# Patient Record
Sex: MALE | Race: WHITE
[De-identification: names, ages, dates, MRNs, and addresses within clinical notes are randomized per-mention and may not be internally consistent; named-entity substitution may affect disease eponyms.]

---

## 2017-05-20 ENCOUNTER — HOSPITAL ENCOUNTER (INPATIENT)
Dept: HOSPITAL 17 - PHED | Age: 67
LOS: 9 days | Discharge: HOME | DRG: 871 | End: 2017-05-29
Attending: INTERNAL MEDICINE | Admitting: INTERNAL MEDICINE
Payer: MEDICARE

## 2017-05-20 VITALS
DIASTOLIC BLOOD PRESSURE: 64 MMHG | OXYGEN SATURATION: 92 % | HEART RATE: 122 BPM | RESPIRATION RATE: 17 BRPM | TEMPERATURE: 103 F | SYSTOLIC BLOOD PRESSURE: 93 MMHG

## 2017-05-20 VITALS — WEIGHT: 315 LBS | BODY MASS INDEX: 41.75 KG/M2 | HEIGHT: 73 IN

## 2017-05-20 VITALS
HEART RATE: 98 BPM | RESPIRATION RATE: 20 BRPM | DIASTOLIC BLOOD PRESSURE: 52 MMHG | SYSTOLIC BLOOD PRESSURE: 126 MMHG | OXYGEN SATURATION: 96 %

## 2017-05-20 VITALS
SYSTOLIC BLOOD PRESSURE: 131 MMHG | RESPIRATION RATE: 20 BRPM | OXYGEN SATURATION: 97 % | TEMPERATURE: 100.4 F | HEART RATE: 93 BPM | DIASTOLIC BLOOD PRESSURE: 64 MMHG

## 2017-05-20 VITALS
HEART RATE: 110 BPM | RESPIRATION RATE: 18 BRPM | DIASTOLIC BLOOD PRESSURE: 57 MMHG | SYSTOLIC BLOOD PRESSURE: 109 MMHG | OXYGEN SATURATION: 93 %

## 2017-05-20 VITALS — OXYGEN SATURATION: 96 %

## 2017-05-20 VITALS
DIASTOLIC BLOOD PRESSURE: 84 MMHG | RESPIRATION RATE: 16 BRPM | TEMPERATURE: 98.7 F | SYSTOLIC BLOOD PRESSURE: 102 MMHG | OXYGEN SATURATION: 97 % | HEART RATE: 78 BPM

## 2017-05-20 VITALS — HEART RATE: 78 BPM

## 2017-05-20 VITALS — TEMPERATURE: 101.5 F

## 2017-05-20 DIAGNOSIS — E87.6: ICD-10-CM

## 2017-05-20 DIAGNOSIS — Z98.1: ICD-10-CM

## 2017-05-20 DIAGNOSIS — N17.9: ICD-10-CM

## 2017-05-20 DIAGNOSIS — B96.4: ICD-10-CM

## 2017-05-20 DIAGNOSIS — J45.909: ICD-10-CM

## 2017-05-20 DIAGNOSIS — E78.00: ICD-10-CM

## 2017-05-20 DIAGNOSIS — E66.9: ICD-10-CM

## 2017-05-20 DIAGNOSIS — D69.6: ICD-10-CM

## 2017-05-20 DIAGNOSIS — B37.0: ICD-10-CM

## 2017-05-20 DIAGNOSIS — N20.2: ICD-10-CM

## 2017-05-20 DIAGNOSIS — K59.2: ICD-10-CM

## 2017-05-20 DIAGNOSIS — Z87.442: ICD-10-CM

## 2017-05-20 DIAGNOSIS — G82.54: ICD-10-CM

## 2017-05-20 DIAGNOSIS — E87.70: ICD-10-CM

## 2017-05-20 DIAGNOSIS — J44.9: ICD-10-CM

## 2017-05-20 DIAGNOSIS — D64.9: ICD-10-CM

## 2017-05-20 DIAGNOSIS — E78.5: ICD-10-CM

## 2017-05-20 DIAGNOSIS — I10: ICD-10-CM

## 2017-05-20 DIAGNOSIS — A41.51: Primary | ICD-10-CM

## 2017-05-20 DIAGNOSIS — Z93.3: ICD-10-CM

## 2017-05-20 DIAGNOSIS — G47.33: ICD-10-CM

## 2017-05-20 DIAGNOSIS — K21.9: ICD-10-CM

## 2017-05-20 DIAGNOSIS — E11.65: ICD-10-CM

## 2017-05-20 DIAGNOSIS — E87.2: ICD-10-CM

## 2017-05-20 DIAGNOSIS — N31.9: ICD-10-CM

## 2017-05-20 DIAGNOSIS — R00.0: ICD-10-CM

## 2017-05-20 DIAGNOSIS — F32.9: ICD-10-CM

## 2017-05-20 DIAGNOSIS — N13.6: ICD-10-CM

## 2017-05-20 DIAGNOSIS — N39.0: ICD-10-CM

## 2017-05-20 DIAGNOSIS — Z86.14: ICD-10-CM

## 2017-05-20 DIAGNOSIS — R65.21: ICD-10-CM

## 2017-05-20 DIAGNOSIS — E86.0: ICD-10-CM

## 2017-05-20 DIAGNOSIS — K44.9: ICD-10-CM

## 2017-05-20 DIAGNOSIS — Z98.84: ICD-10-CM

## 2017-05-20 LAB
ALP SERPL-CCNC: 59 U/L (ref 45–117)
ALT SERPL-CCNC: 23 U/L (ref 12–78)
ANION GAP SERPL CALC-SCNC: 10 MEQ/L (ref 5–15)
APTT BLD: 33.6 SEC (ref 24.3–30.1)
AST SERPL-CCNC: 18 U/L (ref 15–37)
BACTERIA #/AREA URNS HPF: (no result) /HPF
BASOPHILS # BLD AUTO: 0.2 TH/MM3 (ref 0–0.2)
BASOPHILS NFR BLD: 1 % (ref 0–2)
BILIRUB SERPL-MCNC: 0.5 MG/DL (ref 0.2–1)
BUN SERPL-MCNC: 27 MG/DL (ref 7–18)
CHLORIDE SERPL-SCNC: 99 MEQ/L (ref 98–107)
CK SERPL-CCNC: 147 U/L (ref 39–308)
COLOR UR: (no result)
COMMENT (UR): (no result)
CULTURE IF INDICATED: (no result)
EOSINOPHIL # BLD: 0 TH/MM3 (ref 0–0.4)
EOSINOPHIL NFR BLD: 0.2 % (ref 0–4)
ERYTHROCYTE [DISTWIDTH] IN BLOOD BY AUTOMATED COUNT: 16.8 % (ref 11.6–17.2)
GFR SERPLBLD BASED ON 1.73 SQ M-ARVRAT: 43 ML/MIN (ref 89–?)
GLUCOSE UR STRIP-MCNC: (no result) MG/DL
HCO3 BLD-SCNC: 26.5 MEQ/L (ref 21–32)
HCT VFR BLD CALC: 33.2 % (ref 39–51)
HEMO FLAGS: (no result)
HGB UR QL STRIP: (no result)
INR PPP: 1.1 RATIO
KETONES UR STRIP-MCNC: (no result) MG/DL
LABORATORY COMMENT REPORT: (no result)
LACTIC ACID GHOST: (no result)
LEUKOCYTE ESTERASE UR QL STRIP: (no result) /HPF (ref 0–5)
LYMPHOCYTES # BLD AUTO: 0.5 TH/MM3 (ref 1–4.8)
LYMPHOCYTES NFR BLD AUTO: 2.7 % (ref 9–44)
MCH RBC QN AUTO: 27.5 PG (ref 27–34)
MCHC RBC AUTO-ENTMCNC: 33.8 % (ref 32–36)
MCV RBC AUTO: 81.2 FL (ref 80–100)
MONOCYTES NFR BLD: 5.8 % (ref 0–8)
NEUTROPHILS # BLD AUTO: 15.7 TH/MM3 (ref 1.8–7.7)
NEUTROPHILS NFR BLD AUTO: 90.3 % (ref 16–70)
NEUTS BAND # BLD MANUAL: 16 TH/MM3 (ref 1.8–7.7)
NEUTS BAND NFR BLD: 10 % (ref 0–6)
NEUTS SEG NFR BLD MANUAL: 82 % (ref 16–70)
NITRITE UR QL STRIP: (no result)
PLAT MORPH BLD: NORMAL
PLATELET # BLD: 250 TH/MM3 (ref 150–450)
PLATELET BLD QL SMEAR: NORMAL
POTASSIUM SERPL-SCNC: 3.5 MEQ/L (ref 3.5–5.1)
PROTHROMBIN TIME: 11.7 SEC (ref 9.8–11.6)
RBC # BLD AUTO: 4.09 MIL/MM3 (ref 4.5–5.9)
RBC #/AREA URNS HPF: (no result) /HPF (ref 0–3)
RBC MORPH BLD: NORMAL
SCAN/DIFF: (no result)
SODIUM SERPL-SCNC: 135 MEQ/L (ref 136–145)
SP GR UR STRIP: 1.02 (ref 1–1.03)
SQUAMOUS #/AREA URNS HPF: (no result) /HPF (ref 0–5)
WBC # BLD AUTO: 17.4 TH/MM3 (ref 4–11)
WBC DIFF SAMPLE: 100

## 2017-05-20 PROCEDURE — C1894 INTRO/SHEATH, NON-LASER: HCPCS

## 2017-05-20 PROCEDURE — 87077 CULTURE AEROBIC IDENTIFY: CPT

## 2017-05-20 PROCEDURE — 93005 ELECTROCARDIOGRAM TRACING: CPT

## 2017-05-20 PROCEDURE — 84100 ASSAY OF PHOSPHORUS: CPT

## 2017-05-20 PROCEDURE — 81001 URINALYSIS AUTO W/SCOPE: CPT

## 2017-05-20 PROCEDURE — 87070 CULTURE OTHR SPECIMN AEROBIC: CPT

## 2017-05-20 PROCEDURE — P9047 ALBUMIN (HUMAN), 25%, 50ML: HCPCS

## 2017-05-20 PROCEDURE — 51702 INSERT TEMP BLADDER CATH: CPT

## 2017-05-20 PROCEDURE — 80202 ASSAY OF VANCOMYCIN: CPT

## 2017-05-20 PROCEDURE — 87205 SMEAR GRAM STAIN: CPT

## 2017-05-20 PROCEDURE — 82948 REAGENT STRIP/BLOOD GLUCOSE: CPT

## 2017-05-20 PROCEDURE — 74176 CT ABD & PELVIS W/O CONTRAST: CPT

## 2017-05-20 PROCEDURE — 94668 MNPJ CHEST WALL SBSQ: CPT

## 2017-05-20 PROCEDURE — 96367 TX/PROPH/DG ADDL SEQ IV INF: CPT

## 2017-05-20 PROCEDURE — 94640 AIRWAY INHALATION TREATMENT: CPT

## 2017-05-20 PROCEDURE — 85025 COMPLETE CBC W/AUTO DIFF WBC: CPT

## 2017-05-20 PROCEDURE — 83735 ASSAY OF MAGNESIUM: CPT

## 2017-05-20 PROCEDURE — 87186 SC STD MICRODIL/AGAR DIL: CPT

## 2017-05-20 PROCEDURE — 36556 INSERT NON-TUNNEL CV CATH: CPT

## 2017-05-20 PROCEDURE — C1729 CATH, DRAINAGE: HCPCS

## 2017-05-20 PROCEDURE — 84484 ASSAY OF TROPONIN QUANT: CPT

## 2017-05-20 PROCEDURE — 87040 BLOOD CULTURE FOR BACTERIA: CPT

## 2017-05-20 PROCEDURE — 85027 COMPLETE CBC AUTOMATED: CPT

## 2017-05-20 PROCEDURE — 96365 THER/PROPH/DIAG IV INF INIT: CPT

## 2017-05-20 PROCEDURE — 85730 THROMBOPLASTIN TIME PARTIAL: CPT

## 2017-05-20 PROCEDURE — 83880 ASSAY OF NATRIURETIC PEPTIDE: CPT

## 2017-05-20 PROCEDURE — 76775 US EXAM ABDO BACK WALL LIM: CPT

## 2017-05-20 PROCEDURE — 71010: CPT

## 2017-05-20 PROCEDURE — 84132 ASSAY OF SERUM POTASSIUM: CPT

## 2017-05-20 PROCEDURE — 87086 URINE CULTURE/COLONY COUNT: CPT

## 2017-05-20 PROCEDURE — 50432 PLMT NEPHROSTOMY CATHETER: CPT

## 2017-05-20 PROCEDURE — 83605 ASSAY OF LACTIC ACID: CPT

## 2017-05-20 PROCEDURE — 0T9B70Z DRAINAGE OF BLADDER WITH DRAINAGE DEVICE, VIA NATURAL OR ARTIFICIAL OPENING: ICD-10-PCS

## 2017-05-20 PROCEDURE — 85610 PROTHROMBIN TIME: CPT

## 2017-05-20 PROCEDURE — C1769 GUIDE WIRE: HCPCS

## 2017-05-20 PROCEDURE — 87641 MR-STAPH DNA AMP PROBE: CPT

## 2017-05-20 PROCEDURE — 80048 BASIC METABOLIC PNL TOTAL CA: CPT

## 2017-05-20 PROCEDURE — 82550 ASSAY OF CK (CPK): CPT

## 2017-05-20 PROCEDURE — 94667 MNPJ CHEST WALL 1ST: CPT

## 2017-05-20 PROCEDURE — 80053 COMPREHEN METABOLIC PANEL: CPT

## 2017-05-20 PROCEDURE — 94664 DEMO&/EVAL PT USE INHALER: CPT

## 2017-05-20 PROCEDURE — 85007 BL SMEAR W/DIFF WBC COUNT: CPT

## 2017-05-20 RX ADMIN — BACLOFEN SCH MG: 10 TABLET ORAL at 23:34

## 2017-05-20 RX ADMIN — GABAPENTIN SCH MG: 300 CAPSULE ORAL at 23:34

## 2017-05-20 RX ADMIN — PHENYTOIN SODIUM SCH MLS/HR: 50 INJECTION INTRAMUSCULAR; INTRAVENOUS at 23:40

## 2017-05-20 NOTE — PD
Physical Exam


Date Seen by Provider:  May 20, 2017


Time Seen by Provider:  19:14


Narrative


accepted in transfer of care from Dr Rodrigues


GENERAL: Well-developed well-nourished male in no acute respiratory distress


SKIN: Warm and dry.


HEAD: Normocephalic.


EYES: No scleral icterus. No injection or drainage. 


NECK: Supple, trachea midline. No JVD or lymphadenopathy.


CARDIOVASCULAR: Regular rate and rhythm without murmurs, gallops, or rubs. 


RESPIRATORY: Breath sounds equal bilaterally. No accessory muscle use.


GASTROINTESTINAL: Abdomen soft, non-tender, nondistended. 











Data


Data


Last Documented VS





Vital Signs








  Date Time  Temp Pulse Resp B/P Pulse Ox O2 Delivery O2 Flow Rate FiO2


 


5/20/17 20:19 100.4 93 20 131/64 97 Room Air  








Orders





 Sodium Chlor 0.9% 1000 Ml Inj (Ns 1000 M (5/20/17 18:45)


Sodium Chlor 0.9% 1000 Ml Inj (Ns 1000 M (5/20/17 18:45)


Acetaminophen (Tylenol) (5/20/17 18:45)


Electrocardiogram (5/20/17 18:32)


Complete Blood Count With Diff (5/20/17 18:32)


Comprehensive Metabolic Panel (5/20/17 18:32)


Creatine Kinase (Cpk) (5/20/17 18:32)


Troponin I (5/20/17 18:32)


B-Type Natriuretic Peptide (5/20/17 18:32)


Prothrombin Time / Inr (Pt) (5/20/17 18:32)


Act Partial Throm Time (Ptt) (5/20/17 18:32)


Blood Culture (5/20/17 18:32)


Urinalysis - C+S If Indicated (5/20/17 18:32)


Chest, Single Ap (5/20/17 18:32)


Iv Access Insert/Monitor (5/20/17 18:32)


Ecg Monitoring (5/20/17 18:32)


Oximetry (5/20/17 18:32)


Urinary Catheter Insert/Apply (5/20/17 18:32)


Lactic Acid Sepsis Protocol (5/20/17 18:32)


Piperacil-Tazo 3.375 Gm Premix (Zosyn 3. (5/20/17 18:45)


Vancomycin Inj (Vancomycin Inj) (5/20/17 18:45)


Urine Culture (5/20/17 19:20)


Admit Order (Ed Use Only) (5/20/17 )


^ Saline Lock (5/20/17 20:29)


Resp Oxygen Ever C Titrat 1-4 L (5/20/17 )


Notify Dr: Other (5/20/17 20:29)


Sodium Chloride 0.9% Flush (Ns Flush) (5/20/17 21:00)


Sodium Chloride 0.9% Flush (Ns Flush) (5/20/17 20:30)





Labs





 Laboratory Tests








Test 5/20/17 5/20/17





 18:50 19:20


 


White Blood Count 17.4 TH/MM3 


 


Red Blood Count 4.09 MIL/MM3 


 


Hemoglobin 11.2 GM/DL 


 


Hematocrit 33.2 % 


 


Mean Corpuscular Volume 81.2 FL 


 


Mean Corpuscular Hemoglobin 27.5 PG 


 


Mean Corpuscular Hemoglobin 33.8 % 





Concent  


 


Red Cell Distribution Width 16.8 % 


 


Platelet Count 250 TH/MM3 


 


Mean Platelet Volume 8.1 FL 


 


Neutrophils (%) (Auto) 90.3 % 


 


Lymphocytes (%) (Auto) 2.7 % 


 


Monocytes (%) (Auto) 5.8 % 


 


Eosinophils (%) (Auto) 0.2 % 


 


Basophils (%) (Auto) 1.0 % 


 


Neutrophils # (Auto) 15.7 TH/MM3 


 


Lymphocytes # (Auto) 0.5 TH/MM3 


 


Monocytes # (Auto) 1.0 TH/MM3 


 


Eosinophils # (Auto) 0.0 TH/MM3 


 


Basophils # (Auto) 0.2 TH/MM3 


 


CBC Comment AUTO DIFF  


 


Differential Total Cells 100  





Counted  


 


Neutrophils % (Manual) 82 % 


 


Band Neutrophils % 10 % 


 


Lymphocytes % 2 % 


 


Monocytes % 6 % 


 


Neutrophils # (Manual) 16.0 TH/MM3 


 


Differential Comment FINAL DIFF 





 MANUAL 


 


Platelet Estimate NORMAL  


 


Platelet Morphology Comment NORMAL  


 


Red Cell Morphology Comment NORMAL  


 


Prothrombin Time 11.7 SEC 


 


Prothromb Time International 1.1 RATIO 





Ratio  


 


Activated Partial 33.6 SEC 





Thromboplast Time  


 


Sodium Level 135 MEQ/L 


 


Potassium Level 3.5 MEQ/L 


 


Chloride Level 99 MEQ/L 


 


Carbon Dioxide Level 26.5 MEQ/L 


 


Anion Gap 10 MEQ/L 


 


Blood Urea Nitrogen 27 MG/DL 


 


Creatinine 1.60 MG/DL 


 


Estimat Glomerular Filtration 43 ML/MIN 





Rate  


 


Random Glucose 257 MG/DL 


 


Lactic Acid Level 2.4 mmol/L 


 


Calcium Level 8.8 MG/DL 


 


Total Bilirubin 0.5 MG/DL 


 


Aspartate Amino Transf 18 U/L 





(AST/SGOT)  


 


Alanine Aminotransferase 23 U/L 





(ALT/SGPT)  


 


Alkaline Phosphatase 59 U/L 


 


Total Creatine Kinase 147 U/L 


 


Troponin I LESS THAN 0.02 





 NG/ML 


 


B-Type Natriuretic Peptide 26 PG/ML 


 


Total Protein 7.3 GM/DL 


 


Albumin 2.8 GM/DL 


 


Urine Color  PINK 


 


Urine Turbidity  CLOUDY 


 


Urine pH  7.0 


 


Urine Specific Gravity  1.019 


 


Urine Protein  100 mg/dL


 


Urine Glucose (UA)  NEG mg/dL


 


Urine Ketones  NEG mg/dL


 


Urine Occult Blood  LARGE 


 


Urine Nitrite  POS 


 


Urine Bilirubin  NEG 


 


Urine Leukocyte Esterase  LARGE 


 


Urine RBC  INNUM /hpf


 


Urine WBC  INNUM /hpf


 


Urine Squamous Epithelial  0-5 /hpf





Cells  


 


Urine Triple Phosphate  FEW /hpf





Crystals  


 


Urine Bacteria  MANY /hpf


 


Microscopic Urinalysis Comment  CATH-CULTURE





  IND











MDM


Medical Record Reviewed:  Yes


Supervised Visit with ANNIE:  No


Interpretation(s)


CBC & BMP Diagram


5/20/17 18:50











 Vital Signs








  Date Time  Temp Pulse Resp B/P Pulse Ox O2 Delivery O2 Flow Rate FiO2


 


5/20/17 19:39  96    Room Air  


 


5/20/17 19:38  98 20 126/52 96 Room Air  


 


5/20/17 19:05 101.5       


 


5/20/17 18:55  110 18 109/57 93   


 


5/20/17 18:09 103.0 122 17 93/64 92   





cath UA: Positive nitrites positive leukocyte Estrace positive blood positive 

innumerable red blood cells innumerable white blood cells many bacteria; 

culture indicated


Differential Diagnosis


accepted in transfer of care from Dr Rodrigues; please refer to his dictation


Narrative Course


accepted in transfer of care from Dr Rodrigues; follow up of pending labs and 

admission for sepsis; has been given 2 L NS IVF and zosyn and vancomycin IVPB 

along with acetaminophen


Patient with gcs 15; informed of imaging results for possible scarring versus 

atelectasis versus right base consolidation patient reports that he has had 

this finding for some time.  States that her shortness of breath this time.  

States that his chronic congestion has not changed and no discoloration of 

phlegm production.  Catheterized specimen just collected for urinalysis.  

Patient is aware of plan for admission for ongoing IV antibiotics for diagnosis 

of sepsis with urine as suspected source.


Urinalysis positive for many bacteria nitrites leukocyte Estrace innumerable wbc

's/rbc's; cx indicated


call placed to intensivist; patient and spouse aware of need for admission


At 8:30 patient is showing improvement of vital signs normotensive heart rate 

has decreased and patient is slowly defervesced seeing after acetaminophen 

administration


Critical Care Narrative


Aggregate critical care time was 35 minutes. Time to perform other separately 

billable procedures was not included in the critical care time. My time did not 

include minutes spent treating any other patients simultaneously or on 

activities that did not directly contribute to the patient's treatment.  





The services I provided to this patient were to treat and/or prevent clinically 

significant deterioration that could result in: Septic shock, arrhythmia, death





I provided critical care services requiring my management, as noted below:


Chart data review, documentation time, medication orders and management, vital 

sign assessments/reviewing monitor data, ordering and reviewing lab tests, 

ordering and interpreting/reviewing x-rays and diagnostic studies, care of the 

patient and discussion of the patient with the admitting physicians.


Sepsis Criteria


SIRS Criteria (2 or more):  Temp > 100.9 or < 96.8, Heart rate over 90, WBC > 

84063, < 4000 or > 10% bands


Sepsis Criteria (SIRS+source):  Infect source susp/known (urine)


Severe Sepsis (+one):  Lactate >2, Acute Oliguria/Renal Failure





Physician Communication


Physician Communication


call placed to intensivist 1951; discussed with Dr Moctezuma 20:32





Diagnosis





 Primary Impression:  


 Sepsis


 Qualified Code:  A41.9 - Sepsis, due to unspecified organism


 Additional Impressions:  


 UTI (urinary tract infection)


 Qualified Code:  N39.0 - Urinary tract infection with hematuria, site 

unspecified


 Neurogenic bladder


 Quadriplegia, C5-C7 incomplete


 Renal insufficiency


 History of COPD





Admitting Information


Admitting Physician Requests:  Admit








Cleo Lopes MD May 20, 2017 19:23

## 2017-05-20 NOTE — RADHPO
EXAM DATE/TIME:  05/20/2017 18:42 

 

[facility] COMPARISON:     

No previous studies available for comparison.

 

                     

INDICATIONS :     

Fever.

                     

 

MEDICAL HISTORY :            

Paralysis   

 

SURGICAL HISTORY :     

Colostomy.   Anterior cervical fusion

 

ENCOUNTER:     

Initial                                        

 

ACUITY:     

1 day      

 

PAIN SCORE:     

2/10

 

LOCATION:     

Bilateral chest 

 

FINDINGS:     

The heart size is normal.  There is some mild increased density at the medial right base.  Left lung 
appears grossly clear.  A significant effusion is not seen.  There is hardware identified in the cerv
ical spine.  There is a levo curvature of the thoracolumbar region. 

 

CONCLUSION:     

Mild increased density in the medial right base representing some degree of mild atelectasis or conso
lidation.

 

 

 Mike Chilel MD on May 20, 2017 at 19:19                

Board Certified Radiologist.

 This report was verified electronically.

## 2017-05-20 NOTE — PD
HPI


Chief Complaint:  Cold / Flu Symptoms


Time Seen by Provider:  18:26


Travel History


International Travel<30 days:  No


Contact w/Intl Traveler<30days:  No


Traveled to known affect area:  No





History of Present Illness


HPI


67 years old male complains of coughing congestion, fever chills or generalized 

malaise and weakness.  Patient states the symptoms started yesterday.  Patient 

states that the cough is intermittent and dry cough.  Patient denies any chest 

pain or shortness of breath.  Patient states that he has persistent mild aching 

headache since yesterday.  Patient denies any visual change.  Patient denies 

any neck pain.  Patient states that he has abdominal cramping since yesterday 

also.  Patient states that he has nausea but no vomiting or diarrhea.  Patient 

states that he has poor appetite for the past 2 days.  Patient has history of 

quadriplegic C5 C7 incomplete, neurogenic bladder, neurogenic bowel, colostomy, 

hypertension, diabetes, COPD.  Patient self catheter himself at home.  Patient 

states that the urine has been bloody recently from history of kidney stone.





PFSH


Past Medical History


Asthma:  Yes


Blood Disorders:  No


Anxiety:  No


Depression:  Yes


Cancer:  No


Cardiovascular Problems:  No


High Cholesterol:  Yes


Diabetes:  Yes (TYPE 2)


Diminished Hearing:  No


Endocrine:  Yes


Gastrointestinal Disorders:  Yes (COLOSTOMY)


Genitourinary:  Yes (SELF CATHETERIZATION)


Hiatal Hernia:  Yes


Hypertension:  Yes


Immune Disorder:  No


Musculoskeletal:  Yes (QUADRAPLEGIC SINCE 2007, ARTHRITIES, NECK)


Psychiatric:  Yes (DEPRESSION HX)


Reproductive:  No


Respiratory:  Yes (COPD,ASTHMA SLEEP APNEA, USES CPAP)


Sleep Apnea:  Yes


Thyroid Disease:  No


Pregnant?:  Not Pregnant





Past Surgical History


Abdominal Surgery:  Yes (COLOSTOMY)


AICD:  No


Body Medical Devices:  CERVICAL SPINE FUSION 2007


Joint Replacement:  No


Neurologic Surgery:  Yes (CERVICAL FUSION 6-7)


Pacemaker:  No





Social History


Alcohol Use:  No


Tobacco Use:  No


Substance Use:  No





Allergies-Medications


(Allergen,Severity, Reaction):  


Coded Allergies:  


     Morphine (Verified  Allergy, Severe, 5/20/17)


 HALLUCINATIONS


     Sulfa (Verified  Allergy, Severe, 5/20/17)


 SKIN RASH


     *MDRO Multi-Drug Resistant Organism (Verified  Allergy, Unknown, 2/27/17)


 MRSA 2008


 Enterococcus faecium VRE 2007


 Carbapenem-resistant Acinetobacter baumannii 2007


Reported Meds & Prescriptions





Reported Meds & Active Scripts


Active


Reported


Proventil Hfa 6.7 GM Inh (Albuterol Sulfate) 90 Mcg/Act Aer 1 Puff INH Q6H PRN


Aspirin 81 Mg Tabdr 81 Mg PO DAILY


Baclofen 10 Mg Tab 10 Mg PO BID


Symbicort Inh (Budesonide/Formoterol Fumarate) 160-4.5 Mcg/Act Aero 2 Puff INH 

BID


Desipramine (Desipramine HCl) 25 Mg Tab 25 Mg PO HS


Diphenhydramine (Diphenhydramine HCl) 25 Mg Tab 25 Mg PO HS


Gabapentin 300 Mg Cap 300 Mg PO BID


Hydrochlorothiazide 25 Mg Tab 25 Mg PO DAILY


Losartan (Losartan Potassium) 50 Mg Tab 100 Mg PO DAILY


Omeprazole 20 Mg Tab 20 Mg PO DAILY


Oxybutynin ER 24 HR (Oxybutynin Chloride) 5 Mg Tab 5 Mg PO BID


Phenergan (Promethazine HCl) 25 Mg Tab 25 Mg PO Q4HR PRN


Simvastatin 20 Mg Tab 20 Mg PO HS








Review of Systems


General / Constitutional:  Positive: Fever, Chills


Eyes:  No: Visual changes


HENT:  No: Headaches


Cardiovascular:  No: Chest Pain or Discomfort


Respiratory:  Positive: Cough,  No: Shortness of Breath


Gastrointestinal:  No: Abdominal Pain


Genitourinary:  No: Dysuria


Musculoskeletal:  No: Pain


Skin:  No Rash


Neurologic:  No: Weakness


Psychiatric:  No: Depression


Endocrine:  No: Polydipsia


Hematologic/Lymphatic:  No: Easy Bruising





Physical Exam


Narrative


GENERAL: Well-nourished, well-developed patient.


SKIN: Focused skin assessment warm/dry.


HEAD: Normocephalic.


EYES: No scleral icterus. No injection or drainage. 


NECK: Supple, trachea midline. No JVD or lymphadenopathy.


CARDIOVASCULAR: Regular rate and rhythm without murmurs, gallops, or rubs. 


RESPIRATORY: Breath sounds equal bilaterally. No accessory muscle use.


GASTROINTESTINAL: Abdomen soft, non-tender, nondistended.  Colostomy in place.


MUSCULOSKELETAL: No cyanosis, or edema. 


BACK: Nontender without obvious deformity. No CVA tenderness. 


Neurologic exam: Patient is lethargic however answer questions appropriately.  

Patient is quadriplegic C5 C7 incomplete.





Data


Data


Last Documented VS





Vital Signs








  Date Time  Temp Pulse Resp B/P Pulse Ox O2 Delivery O2 Flow Rate FiO2


 


5/20/17 18:09 103.0 122 17 93/64 92   








Orders





 Sodium Chlor 0.9% 1000 Ml Inj (Ns 1000 M (5/20/17 18:45)


Sodium Chlor 0.9% 1000 Ml Inj (Ns 1000 M (5/20/17 18:45)


Acetaminophen (Tylenol) (5/20/17 18:45)


Electrocardiogram (5/20/17 18:32)


Complete Blood Count With Diff (5/20/17 18:32)


Comprehensive Metabolic Panel (5/20/17 18:32)


Creatine Kinase (Cpk) (5/20/17 18:32)


Troponin I (5/20/17 18:32)


B-Type Natriuretic Peptide (5/20/17 18:32)


Prothrombin Time / Inr (Pt) (5/20/17 18:32)


Act Partial Throm Time (Ptt) (5/20/17 18:32)


Blood Culture (5/20/17 18:32)


Urinalysis - C+S If Indicated (5/20/17 18:32)


Chest, Single Ap (5/20/17 18:32)


Iv Access Insert/Monitor (5/20/17 18:32)


Ecg Monitoring (5/20/17 18:32)


Oximetry (5/20/17 18:32)


Urinary Catheter Insert/Apply (5/20/17 18:32)


Lactic Acid Sepsis Protocol (5/20/17 18:32)


Piperacil-Tazo 3.375 Gm Premix (Zosyn 3. (5/20/17 18:45)


Vancomycin Inj (Vancomycin Inj) (5/20/17 18:45)








MDM


Medical Decision Making


Medical Screen Exam Complete:  Yes


Emergency Medical Condition:  Yes


Medical Record Reviewed:  Yes


Differential Diagnosis


Differential diagnosis including sepsis, pneumonia, UTI, electrolyte imbalance, 

dehydration


Narrative Course


67 years old male with fever chills.  Patient is tachycardic.  Patient met 

criteria for sepsis.  Normal saline solution 2 L IV bolus.  Vancomycin 1 g IV.  

Zosyn 3.375 g IV given.








Mitch Rodrigues MD May 20, 2017 18:44

## 2017-05-21 VITALS — HEART RATE: 84 BPM

## 2017-05-21 VITALS
SYSTOLIC BLOOD PRESSURE: 112 MMHG | HEART RATE: 115 BPM | TEMPERATURE: 102 F | DIASTOLIC BLOOD PRESSURE: 40 MMHG | OXYGEN SATURATION: 99 % | RESPIRATION RATE: 21 BRPM

## 2017-05-21 VITALS
OXYGEN SATURATION: 98 % | DIASTOLIC BLOOD PRESSURE: 61 MMHG | HEART RATE: 78 BPM | RESPIRATION RATE: 17 BRPM | SYSTOLIC BLOOD PRESSURE: 107 MMHG

## 2017-05-21 VITALS
RESPIRATION RATE: 22 BRPM | SYSTOLIC BLOOD PRESSURE: 148 MMHG | DIASTOLIC BLOOD PRESSURE: 64 MMHG | OXYGEN SATURATION: 96 % | HEART RATE: 72 BPM

## 2017-05-21 VITALS
HEART RATE: 96 BPM | DIASTOLIC BLOOD PRESSURE: 65 MMHG | RESPIRATION RATE: 23 BRPM | OXYGEN SATURATION: 99 % | SYSTOLIC BLOOD PRESSURE: 95 MMHG | TEMPERATURE: 100.9 F

## 2017-05-21 VITALS — OXYGEN SATURATION: 96 %

## 2017-05-21 VITALS — HEART RATE: 80 BPM

## 2017-05-21 VITALS — HEART RATE: 112 BPM

## 2017-05-21 VITALS
SYSTOLIC BLOOD PRESSURE: 100 MMHG | TEMPERATURE: 100 F | HEART RATE: 80 BPM | DIASTOLIC BLOOD PRESSURE: 51 MMHG | OXYGEN SATURATION: 99 %

## 2017-05-21 VITALS
HEART RATE: 82 BPM | RESPIRATION RATE: 23 BRPM | OXYGEN SATURATION: 98 % | DIASTOLIC BLOOD PRESSURE: 67 MMHG | SYSTOLIC BLOOD PRESSURE: 104 MMHG

## 2017-05-21 VITALS
SYSTOLIC BLOOD PRESSURE: 135 MMHG | HEART RATE: 84 BPM | RESPIRATION RATE: 15 BRPM | OXYGEN SATURATION: 98 % | TEMPERATURE: 98.5 F | DIASTOLIC BLOOD PRESSURE: 60 MMHG

## 2017-05-21 VITALS — OXYGEN SATURATION: 98 %

## 2017-05-21 VITALS
HEART RATE: 133 BPM | DIASTOLIC BLOOD PRESSURE: 65 MMHG | OXYGEN SATURATION: 93 % | SYSTOLIC BLOOD PRESSURE: 101 MMHG | RESPIRATION RATE: 24 BRPM | TEMPERATURE: 103 F

## 2017-05-21 VITALS — DIASTOLIC BLOOD PRESSURE: 42 MMHG | SYSTOLIC BLOOD PRESSURE: 65 MMHG | HEART RATE: 96 BPM | OXYGEN SATURATION: 96 %

## 2017-05-21 VITALS — HEART RATE: 91 BPM

## 2017-05-21 VITALS
SYSTOLIC BLOOD PRESSURE: 80 MMHG | TEMPERATURE: 105 F | RESPIRATION RATE: 21 BRPM | DIASTOLIC BLOOD PRESSURE: 56 MMHG | OXYGEN SATURATION: 99 % | HEART RATE: 116 BPM

## 2017-05-21 VITALS
HEART RATE: 101 BPM | SYSTOLIC BLOOD PRESSURE: 114 MMHG | DIASTOLIC BLOOD PRESSURE: 54 MMHG | OXYGEN SATURATION: 94 % | RESPIRATION RATE: 23 BRPM | TEMPERATURE: 101 F

## 2017-05-21 VITALS
DIASTOLIC BLOOD PRESSURE: 67 MMHG | SYSTOLIC BLOOD PRESSURE: 102 MMHG | HEART RATE: 90 BPM | RESPIRATION RATE: 27 BRPM | OXYGEN SATURATION: 96 %

## 2017-05-21 VITALS — OXYGEN SATURATION: 95 %

## 2017-05-21 VITALS — HEART RATE: 89 BPM

## 2017-05-21 LAB
ALP SERPL-CCNC: 83 U/L (ref 45–117)
ALT SERPL-CCNC: 22 U/L (ref 12–78)
ANION GAP SERPL CALC-SCNC: 10 MEQ/L (ref 5–15)
AST SERPL-CCNC: 21 U/L (ref 15–37)
BASOPHILS # BLD AUTO: 0 TH/MM3 (ref 0–0.2)
BASOPHILS NFR BLD: 0.1 % (ref 0–2)
BILIRUB SERPL-MCNC: 0.5 MG/DL (ref 0.2–1)
BUN SERPL-MCNC: 26 MG/DL (ref 7–18)
CHLORIDE SERPL-SCNC: 102 MEQ/L (ref 98–107)
EOSINOPHIL # BLD: 0 TH/MM3 (ref 0–0.4)
EOSINOPHIL NFR BLD: 0 % (ref 0–4)
ERYTHROCYTE [DISTWIDTH] IN BLOOD BY AUTOMATED COUNT: 16.8 % (ref 11.6–17.2)
GFR SERPLBLD BASED ON 1.73 SQ M-ARVRAT: 41 ML/MIN (ref 89–?)
HCO3 BLD-SCNC: 25.7 MEQ/L (ref 21–32)
HCT VFR BLD CALC: 32.1 % (ref 39–51)
HEMO FLAGS: (no result)
LYMPHOCYTES # BLD AUTO: 0.3 TH/MM3 (ref 1–4.8)
LYMPHOCYTES NFR BLD AUTO: 2.1 % (ref 9–44)
MCH RBC QN AUTO: 27 PG (ref 27–34)
MCHC RBC AUTO-ENTMCNC: 33.2 % (ref 32–36)
MCV RBC AUTO: 81.2 FL (ref 80–100)
MONOCYTES NFR BLD: 0.8 % (ref 0–8)
NEUTROPHILS # BLD AUTO: 13 TH/MM3 (ref 1.8–7.7)
NEUTROPHILS NFR BLD AUTO: 97 % (ref 16–70)
NEUTS BAND # BLD MANUAL: 13.1 TH/MM3 (ref 1.8–7.7)
NEUTS BAND NFR BLD: 32 % (ref 0–6)
NEUTS SEG NFR BLD MANUAL: 66 % (ref 16–70)
PLAT MORPH BLD: NORMAL
PLATELET # BLD: 196 TH/MM3 (ref 150–450)
PLATELET BLD QL SMEAR: NORMAL
POTASSIUM SERPL-SCNC: 2.9 MEQ/L (ref 3.5–5.1)
RBC # BLD AUTO: 3.95 MIL/MM3 (ref 4.5–5.9)
RBC MORPH BLD: NORMAL
SCAN/DIFF: (no result)
SODIUM SERPL-SCNC: 138 MEQ/L (ref 136–145)
WBC # BLD AUTO: 13.4 TH/MM3 (ref 4–11)
WBC DIFF SAMPLE: 100

## 2017-05-21 PROCEDURE — 05HN33Z INSERTION OF INFUSION DEVICE INTO LEFT INTERNAL JUGULAR VEIN, PERCUTANEOUS APPROACH: ICD-10-PCS | Performed by: INTERNAL MEDICINE

## 2017-05-21 RX ADMIN — IPRATROPIUM BROMIDE AND ALBUTEROL SULFATE SCH AMPULE: .5; 3 SOLUTION RESPIRATORY (INHALATION) at 07:31

## 2017-05-21 RX ADMIN — POTASSIUM CHLORIDE SCH MLS/HR: 200 INJECTION, SOLUTION INTRAVENOUS at 19:49

## 2017-05-21 RX ADMIN — SODIUM CHLORIDE SCH MLS/HR: 900 INJECTION INTRAVENOUS at 19:13

## 2017-05-21 RX ADMIN — BUDESONIDE AND FORMOTEROL FUMARATE DIHYDRATE SCH PUFF: 160; 4.5 AEROSOL RESPIRATORY (INHALATION) at 09:52

## 2017-05-21 RX ADMIN — PHENYTOIN SODIUM SCH MLS/HR: 50 INJECTION INTRAMUSCULAR; INTRAVENOUS at 22:06

## 2017-05-21 RX ADMIN — PHENYTOIN SODIUM SCH MLS/HR: 50 INJECTION INTRAMUSCULAR; INTRAVENOUS at 09:51

## 2017-05-21 RX ADMIN — INSULIN ASPART SCH: 100 INJECTION, SOLUTION INTRAVENOUS; SUBCUTANEOUS at 09:00

## 2017-05-21 RX ADMIN — TOLTERODINE TARTRATE SCH MG: 4 CAPSULE, EXTENDED RELEASE ORAL at 09:57

## 2017-05-21 RX ADMIN — INSULIN ASPART SCH: 100 INJECTION, SOLUTION INTRAVENOUS; SUBCUTANEOUS at 17:00

## 2017-05-21 RX ADMIN — IPRATROPIUM BROMIDE AND ALBUTEROL SULFATE SCH AMPULE: .5; 3 SOLUTION RESPIRATORY (INHALATION) at 13:21

## 2017-05-21 RX ADMIN — TAZOBACTAM SODIUM AND PIPERACILLIN SODIUM SCH MLS/HR: 500; 4 INJECTION, SOLUTION INTRAVENOUS at 14:51

## 2017-05-21 RX ADMIN — TAZOBACTAM SODIUM AND PIPERACILLIN SODIUM SCH MLS/HR: 500; 4 INJECTION, SOLUTION INTRAVENOUS at 19:49

## 2017-05-21 RX ADMIN — PROMETHAZINE HYDROCHLORIDE PRN MG: 25 TABLET ORAL at 10:37

## 2017-05-21 RX ADMIN — PANTOPRAZOLE SODIUM SCH MG: 20 TABLET, DELAYED RELEASE ORAL at 09:52

## 2017-05-21 RX ADMIN — PHENYTOIN SODIUM SCH MLS/HR: 50 INJECTION INTRAMUSCULAR; INTRAVENOUS at 14:52

## 2017-05-21 RX ADMIN — IPRATROPIUM BROMIDE AND ALBUTEROL SULFATE SCH AMPULE: .5; 3 SOLUTION RESPIRATORY (INHALATION) at 19:33

## 2017-05-21 RX ADMIN — ASPIRIN SCH MG: 81 TABLET ORAL at 09:52

## 2017-05-21 RX ADMIN — POTASSIUM CHLORIDE SCH MLS/HR: 200 INJECTION, SOLUTION INTRAVENOUS at 19:14

## 2017-05-21 RX ADMIN — Medication SCH ML: at 20:03

## 2017-05-21 RX ADMIN — Medication SCH ML: at 10:08

## 2017-05-21 RX ADMIN — GABAPENTIN SCH MG: 300 CAPSULE ORAL at 09:52

## 2017-05-21 RX ADMIN — INSULIN ASPART SCH: 100 INJECTION, SOLUTION INTRAVENOUS; SUBCUTANEOUS at 20:02

## 2017-05-21 RX ADMIN — BACLOFEN SCH MG: 10 TABLET ORAL at 09:52

## 2017-05-21 RX ADMIN — TAZOBACTAM SODIUM AND PIPERACILLIN SODIUM SCH MLS/HR: 500; 4 INJECTION, SOLUTION INTRAVENOUS at 02:41

## 2017-05-21 RX ADMIN — OXYCODONE HYDROCHLORIDE AND ACETAMINOPHEN SCH MG: 500 TABLET ORAL at 20:04

## 2017-05-21 RX ADMIN — ENOXAPARIN SODIUM SCH MG: 40 INJECTION SUBCUTANEOUS at 09:53

## 2017-05-21 RX ADMIN — TAZOBACTAM SODIUM AND PIPERACILLIN SODIUM SCH MLS/HR: 500; 4 INJECTION, SOLUTION INTRAVENOUS at 09:56

## 2017-05-21 RX ADMIN — BUDESONIDE AND FORMOTEROL FUMARATE DIHYDRATE SCH PUFF: 160; 4.5 AEROSOL RESPIRATORY (INHALATION) at 20:03

## 2017-05-21 RX ADMIN — OXYCODONE HYDROCHLORIDE AND ACETAMINOPHEN SCH MG: 500 TABLET ORAL at 09:52

## 2017-05-21 RX ADMIN — CHLORHEXIDINE GLUCONATE SCH PACK: 500 CLOTH TOPICAL at 03:55

## 2017-05-21 RX ADMIN — INSULIN ASPART SCH: 100 INJECTION, SOLUTION INTRAVENOUS; SUBCUTANEOUS at 13:00

## 2017-05-21 RX ADMIN — PRAVASTATIN SODIUM SCH MG: 40 TABLET ORAL at 20:04

## 2017-05-21 RX ADMIN — SODIUM CHLORIDE SCH MLS/HR: 900 INJECTION INTRAVENOUS at 16:00

## 2017-05-21 NOTE — RADHPO
EXAM DATE/TIME:  05/21/2017 05:12 

 

[facility] COMPARISON:     

CHEST SINGLE AP, May 20, 2017, 18:42.

 

                     

INDICATIONS :     

Cough. 

                     

 

MEDICAL HISTORY :            

Paralysis   

 

SURGICAL HISTORY :        

Colostomy. Anterior cervical fusion

 

ENCOUNTER:     

Subsequent                                        

 

ACUITY:     

2 days      

 

PAIN SCORE:     

0/10

 

LOCATION:     

Bilateral chest 

 

FINDINGS:     

A single view of the chest demonstrates minimal basilar atelectasis. No significant effusion. No pneu
mothorax. Previous fusion lower cervical spine. Tortuous aorta.

 

CONCLUSION:     

1. Minimal basilar atelectasis.

 

 

 

 Gustavo Brink MD on May 21, 2017 at 5:42           

Board Certified Radiologist.

 This report was verified electronically.

## 2017-05-21 NOTE — HHI.HP
HPI


Service


Critical Care Medicine


Primary Care Physician


Humza Hernandez MD


Admission Diagnosis


severe sepsis; uti; renal insufficiency;copd; quadriplegia


Diagnosis:  


(1) Severe sepsis


Diagnosis:  Principal





(2) Lactic acidemia


Diagnosis:  Principal





(3) UTI (urinary tract infection)


Diagnosis:  Principal





(4) Hypotension


Diagnosis:  Principal





(5) Acute kidney failure


Diagnosis:  Principal





(6) Quadriplegia, C5-C7 incomplete


Diagnosis:  Secondary





(7) Neurogenic bladder


Diagnosis:  Secondary





(8) Obesity


Diagnosis:  Secondary





(9) Depression


Diagnosis:  Secondary





(10) Type 2 diabetes mellitus


Diagnosis:  Secondary





Chief Complaint:  


Severe sepsis


UTI


Travel History


International Travel<30 Days:  No


Contact w/Intl Traveler <30 Da:  No


Traveled to Known Affected Are:  No





Sepsis Criteria


SIRS Criteria (2 or more):  Temp > 100.9 or < 96.8, WBC > 62465, < 4000 or > 10

% bands


Severe Sepsis (+one):  Lactate >2, Acute Oliguria/Renal Failure


Criteria Outcome:  Meets severe sepsis criteria


History of Present Illness


Patient is a 67-year-old  male with past medical history of incomplete 

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 

self-catheterization, type 2 diabetes, hypertension, COPD/asthma, obstructive 

sleep apnea and obesity who presented to the [facility] emergency department 

with cough congestion, fever chills and weakness.  Patient states the symptoms 

started 5/19/17.  Cough is nonproductive.  Patient also feels that he may be 

having a urinary tract infection as he has seen color change, urine was bloody 

and somewhat cloudy.  In the ER patient had a blood pressure of 93/64, MAXIMUM 

TEMPERATURE was 103.  Lab workup showed that white count was 17.4, lactic acid 

2.4.  BUN was 27 creatinine 1.6 his previous creatinine 2015 was 0.62.  UA 

showed evidence of UTI.  Patient was admitted to critical care service to the 

ICU.  He received 2 L IV fluid boluses and also was placed on vancomycin and 

azithromycin and Zosyn.





I evaluated the patient in ICU.  He appears ill but improving.  Blood pressure 

has stabilized.  He is making urine.  Fever trending down





Review of Systems


ROS Limitations:  Other (as per HPI)





Past Family Social History


Allergies:  


Coded Allergies:  


     Morphine (Verified  Allergy, Severe, 5/20/17)


 HALLUCINATIONS


     Sulfa (Verified  Allergy, Severe, 5/20/17)


 SKIN RASH


     *MDRO Multi-Drug Resistant Organism (Verified  Allergy, Unknown, 2/27/17)


 MRSA 2008


 Enterococcus faecium VRE 2007


 Carbapenem-resistant Acinetobacter baumannii 2007


Past Medical History


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


Neurogenic bladder


Type 2 diabetes


Hypertension


Dyslipidemia


Obstructive sleep apnea


Asthma COPD


Past Surgical History


Cervical spine fusion in 2007


Colostomy in 2007


Gastric bypass surgery in 2014


Reported Medications


Proventil Hfa 6.7 GM Inh (Albuterol Sulfate) 90 Mcg/Act Aer 1 Puff INH Q6H PRN


Aspirin 81 Mg Tabdr 81 Mg PO DAILY


Baclofen 10 Mg Tab 10 Mg PO BID


Symbicort Inh (Budesonide/Formoterol Fumarate) 160-4.5 Mcg/Act Aero 2 Puff INH 

BID


Desipramine (Desipramine HCl) 25 Mg Tab 25 Mg PO HS


Diphenhydramine (Diphenhydramine HCl) 25 Mg Tab 25 Mg PO HS


Gabapentin 300 Mg Cap 300 Mg PO BID


Hydrochlorothiazide 25 Mg Tab 25 Mg PO DAILY


Losartan (Losartan Potassium) 50 Mg Tab 100 Mg PO DAILY


Omeprazole 20 Mg Tab 20 Mg PO DAILY


Oxybutynin ER 24 HR (Oxybutynin Chloride) 5 Mg Tab 5 Mg PO BID


Phenergan (Promethazine HCl) 25 Mg Tab 25 Mg PO Q4HR PRN


Simvastatin 20 Mg Tab 20 Mg PO HS


Active Ordered Medications


Reviewed, receiving vancomycin, Zosyn and azithromycin


Family History


Reviewed


Social History


No alcohol or tobacco use





Physical Exam


Vital Signs





 Vital Signs








  Date Time  Temp Pulse Resp B/P Pulse Ox O2 Delivery O2 Flow Rate FiO2


 


5/21/17 06:00  84      


 


5/21/17 04:00  91      


 


5/21/17 03:32     95 Nasal Cannula 2.00 


 


5/21/17 02:00  89      


 


5/21/17 00:00 98.5 84 15 135/60 98   


 


5/21/17 00:00  84      


 


5/20/17 23:00  78      


 


5/20/17 22:24      Nasal Cannula  


 


5/20/17 22:00 98.7 78 16 102/84 97   


 


5/20/17 22:00  78      


 


5/20/17 20:38     96   21


 


5/20/17 20:19 100.4 93 20 131/64 97 Room Air  


 


5/20/17 19:39  96    Room Air  


 


5/20/17 19:38  98 20 126/52 96 Room Air  


 


5/20/17 19:05 101.5       


 


5/20/17 18:55  110 18 109/57 93   


 


5/20/17 18:09 103.0 122 17 93/64 92   








Physical Exam


GENERAL: Well-nourished, well-developed patient.  Appears critically ill


SKIN: Skin warm and dry


HEAD: Normocephalic.


EYES: No scleral icterus. No injection or drainage. 


NECK: Supple, trachea midline. No JVD or lymphadenopathy.


CARDIOVASCULAR: Regular rate and rhythm without murmurs, gallops, or rubs. 


RESPIRATORY: Breath sounds equal bilaterally. No accessory muscle use.


GASTROINTESTINAL: Abdomen soft, non-tender, nondistended.  Colostomy in place.


MUSCULOSKELETAL: No cyanosis, or edema. 


BACK: Nontender without obvious deformity.  Well-healed sacral decubitus ulcer


NEURO: Alert awake oriented.  Patient is quadriplegic C5 C7 incomplete. 4/5 

muscle strength upper extremity, 1/5 in [place], neurogenic bladder


Laboratory





Laboratory Tests








Test 5/20/17 5/20/17 5/20/17 5/20/17





 18:50 19:20 22:00 22:10


 


White Blood Count 17.4    


 


Red Blood Count 4.09    


 


Hemoglobin 11.2    


 


Hematocrit 33.2    


 


Mean Corpuscular Volume 81.2    


 


Mean Corpuscular Hemoglobin 27.5    


 


Mean Corpuscular Hemoglobin 33.8    





Concent    


 


Red Cell Distribution Width 16.8    


 


Platelet Count 250    


 


Mean Platelet Volume 8.1    


 


Neutrophils (%) (Auto) 90.3    


 


Lymphocytes (%) (Auto) 2.7    


 


Monocytes (%) (Auto) 5.8    


 


Eosinophils (%) (Auto) 0.2    


 


Basophils (%) (Auto) 1.0    


 


Neutrophils # (Auto) 15.7    


 


Lymphocytes # (Auto) 0.5    


 


Monocytes # (Auto) 1.0    


 


Eosinophils # (Auto) 0.0    


 


Basophils # (Auto) 0.2    


 


CBC Comment AUTO DIFF    


 


Differential Total Cells 100    





Counted    


 


Neutrophils % (Manual) 82    


 


Band Neutrophils % 10    


 


Lymphocytes % 2    


 


Monocytes % 6    


 


Neutrophils # (Manual) 16.0    


 


Differential Comment FINAL DIFF   





 MANUAL   


 


Platelet Estimate NORMAL    


 


Platelet Morphology Comment NORMAL    


 


Red Cell Morphology Comment NORMAL    


 


Prothrombin Time 11.7    


 


Prothromb Time International 1.1    





Ratio    


 


Activated Partial 33.6    





Thromboplast Time    


 


Sodium Level 135    


 


Potassium Level 3.5    


 


Chloride Level 99    


 


Carbon Dioxide Level 26.5    


 


Anion Gap 10    


 


Blood Urea Nitrogen 27    


 


Creatinine 1.60    


 


Estimat Glomerular Filtration 43    





Rate    


 


Random Glucose 257    


 


Lactic Acid Level 2.4    1.6 


 


Calcium Level 8.8    


 


Total Bilirubin 0.5    


 


Aspartate Amino Transf 18    





(AST/SGOT)    


 


Alanine Aminotransferase 23    





(ALT/SGPT)    


 


Alkaline Phosphatase 59    


 


Total Creatine Kinase 147    


 


Troponin I LESS THAN 0.02    


 


B-Type Natriuretic Peptide 26    


 


Total Protein 7.3    


 


Albumin 2.8    


 


Urine Color  PINK   


 


Urine Turbidity  CLOUDY   


 


Urine pH  7.0   


 


Urine Specific Gravity  1.019   


 


Urine Protein  100   


 


Urine Glucose (UA)  NEG   


 


Urine Ketones  NEG   


 


Urine Occult Blood  LARGE   


 


Urine Nitrite  POS   


 


Urine Bilirubin  NEG   


 


Urine Leukocyte Esterase  LARGE   


 


Urine RBC  INNUM   


 


Urine WBC  INNUM   


 


Urine Squamous Epithelial  0-5   





Cells    


 


Urine Triple Phosphate  FEW   





Crystals    


 


Urine Bacteria  MANY   


 


Microscopic Urinalysis Comment  CATH-CULTURE  





  IND  


 


Nasal Screen MRSA (PCR)   MRSA DETECTED  














 Date/Time Procedure Status





Source Growth 


 


 5/20/17 19:20 Urine Culture Received





Urine Catheterized Urine Pending 


 


 5/20/17 18:55 Aerobic Blood Culture Received





Blood Peripheral Pending 





 5/20/17 18:55 Anaerobic Blood Culture Received





Blood Peripheral Pending 








Result Diagram:  


5/20/17 1850 5/20/17 1850





Imaging


CXR Mild bibasilar atelectasis





Septic Shock Reassessment


Heart:  Regular rate and rhythm


Lungs:  Clear


Skin:  Warm


Peripheral Pulses:     Bounding Right Radial


         Bounding Left Radial





Assessment and Plan


Assessment and Plan


NEURO: 


Incomplete quadriplegia from cervical spine injury


Neurogenic bladder


-Minimize sedation, continue home meds (gabapentin, desipramine and baclofen)


-PT evaluation and treatment





RESP: 


History of COPD asthma


Obstructive sleep apnea


-Nasal cannula oxygen


-DuoNeb every 6 hours and when necessary


-Use home CPAP





CV: 


Hypotension secondary to sepsis and dehydration 


Lactic acidemia 


-Normal saline IV fluids 2L bolus and 125 ml per hour


-Lactic acid has normalized


-Holding home antihypertensives, continue aspirin





GI: 


Status post colostomy


-Heart healthy diet. PPI


-Colostomy management





: 


Acute kidney failure secondary to sepsis and dehydration 


Neurogenic bladder


-Monitor renal function closely. Lira catheter. 


-IV as above





ID: 


Severe sepsis 


UTI 


-Follow-up on blood urine cultures, sputum culture if available.  Source of 

sepsis seems to be UTI 


-Continue IV vancomycin and Zosyn and azithromycin 





HEME: 


-Monitor CBC, CMP, coags





ENDO: 


Type 2 diabetes


Hyperglycemia


-Sliding-scale insulin


-Electrolyte replacement per protocol





PROPH: 


-Bilateral lower extremity SCDs. Lovenox 40 mg sq daily. PPI 





LINES:


-Utilize peripheral IVs, central line if needed





CC time 42 min


Code Status


Full


Discussed Condition With


Bedside RN and patient





Problem Qualifiers





(1) UTI (urinary tract infection):  


Qualified Code:  N10 - Acute pyelonephritis


(2) Depression:  


Qualified Code:  F32.9 - Depression, unspecified depression type


(3) Type 2 diabetes mellitus:  





Rico Mckeon MD May 21, 2017 06:41

## 2017-05-21 NOTE — EKG
Date Performed: 05/20/2017       Time Performed: 18:38:32

 

PTAGE:      67 years

 

EKG:      Sinus tachycardia Low QRS voltages in precordial leads Compared to previous tracing, the florentino ashton is now tachycardic Borderline ECG

 

PREVIOUS TRACING       : 03/28/2014 14.33

 

DOCTOR:   Janeen Washington  Interpretating Date/Time  05/21/2017 16:53:06

## 2017-05-21 NOTE — RADHPO
EXAM DATE/TIME:  05/21/2017 15:39 

 

[facility] COMPARISON:     

CHEST SINGLE AP, May 21, 2017, 5:12.

 

                     

INDICATIONS :     

Post central line placement, fever

                     

 

MEDICAL HISTORY :     

None.          

 

SURGICAL HISTORY :        

cervical

 

ENCOUNTER:     

Subsequent                                        

 

ACUITY:     

3 days      

 

PAIN SCORE:     

Non-responsive.

 

LOCATION:     

Bilateral chest 

 

FINDINGS:     

Mild bibasilar atelectasis again noted. Elevation of the right hemidiaphragm unchanged. No large effu
kasey. No pneumothorax.

 

There is now a left internal jugular central venous catheter with tip near the junction of the left b
rachiocephalic vein and superior vena cava.

 

CONCLUSION:     

1. New left IJ central venous catheter, tip in the upper superior vena cava. No pneumothorax or other
 acute complication.

2. Mild bibasilar atelectasis not significantly changed.

 

 

 

 Mike Gramajo MD on May 21, 2017 at 15:50           

Board Certified Radiologist.

 This report was verified electronically.

## 2017-05-21 NOTE — PD.PROCEDR
Central Line Procedure


REASON FOR PROCEDURE


Central venous access





PROCEDURE PERFORMED


Central line placement: LIJ US guided





CONSENT


Informed consent for procedure was obtained from patient.  The risks and 

benefits of the procedure were discussed to include but limited to bleeding, 

clot formation, infection, and even death.





ANESTHESIA


Local injection of 1% Lidocaine





DESCRIPTION OF THE PROCEDURE


The patient was placed in supine, mild Trendelenburg position.  The area was 

exposed and cleansed with ChloraPrep, times two.  Large sterile drape was used 

to cover the patient, with the site exposed, under sterile conditions including 

cap, face mask, sterile gown, and sterile gloves.  On single attempt, the 

introducer needle was inserted with negative pressure in syringe and venous 

flash was obtained.  The guide wire was then advanced without any restriction 

and the needle was removed.  The dilator was used without any complications.  

Using Seldinger technique the 20 CM 7F triple lumen catheter was advanced over 

the guide wire to a depth of 19 centimeters.  The guide wire was removed.  All 

ports were aspirated with dark venous blood return and flushed easily with 

sterile saline.  All ports were capped.  Antibiotic disc was placed around 

central line at puncture site.  The central line was secured to the skin with 

two interrupted 2.0 silk sutures.  The area was bandaged with sterile see-

through central line bandage.





RADIOLOGICAL DATA


Ultrasound guidance was used to locate LIJ.  Doppler/color flow was used to 

confirm venous flow.





COMPLICATIONS:


No apparent complications





ESTIMATED BLOOD LOSS:


Less than 1 cc.








Rico Mckeon MD May 21, 2017 15:23

## 2017-05-22 VITALS
SYSTOLIC BLOOD PRESSURE: 118 MMHG | DIASTOLIC BLOOD PRESSURE: 57 MMHG | OXYGEN SATURATION: 92 % | RESPIRATION RATE: 11 BRPM | HEART RATE: 100 BPM

## 2017-05-22 VITALS
HEART RATE: 98 BPM | SYSTOLIC BLOOD PRESSURE: 141 MMHG | DIASTOLIC BLOOD PRESSURE: 55 MMHG | OXYGEN SATURATION: 93 % | RESPIRATION RATE: 16 BRPM

## 2017-05-22 VITALS
HEART RATE: 96 BPM | SYSTOLIC BLOOD PRESSURE: 92 MMHG | RESPIRATION RATE: 28 BRPM | OXYGEN SATURATION: 95 % | DIASTOLIC BLOOD PRESSURE: 69 MMHG

## 2017-05-22 VITALS
SYSTOLIC BLOOD PRESSURE: 137 MMHG | RESPIRATION RATE: 28 BRPM | HEART RATE: 94 BPM | DIASTOLIC BLOOD PRESSURE: 55 MMHG | OXYGEN SATURATION: 95 %

## 2017-05-22 VITALS
DIASTOLIC BLOOD PRESSURE: 88 MMHG | OXYGEN SATURATION: 93 % | HEART RATE: 120 BPM | RESPIRATION RATE: 38 BRPM | SYSTOLIC BLOOD PRESSURE: 151 MMHG

## 2017-05-22 VITALS
SYSTOLIC BLOOD PRESSURE: 111 MMHG | DIASTOLIC BLOOD PRESSURE: 57 MMHG | RESPIRATION RATE: 30 BRPM | HEART RATE: 86 BPM | TEMPERATURE: 99.7 F | OXYGEN SATURATION: 95 %

## 2017-05-22 VITALS
RESPIRATION RATE: 24 BRPM | SYSTOLIC BLOOD PRESSURE: 92 MMHG | DIASTOLIC BLOOD PRESSURE: 47 MMHG | HEART RATE: 96 BPM | OXYGEN SATURATION: 96 %

## 2017-05-22 VITALS
OXYGEN SATURATION: 98 % | HEART RATE: 76 BPM | SYSTOLIC BLOOD PRESSURE: 100 MMHG | DIASTOLIC BLOOD PRESSURE: 59 MMHG | RESPIRATION RATE: 28 BRPM

## 2017-05-22 VITALS
DIASTOLIC BLOOD PRESSURE: 70 MMHG | RESPIRATION RATE: 25 BRPM | HEART RATE: 74 BPM | SYSTOLIC BLOOD PRESSURE: 134 MMHG | OXYGEN SATURATION: 99 %

## 2017-05-22 VITALS
DIASTOLIC BLOOD PRESSURE: 78 MMHG | OXYGEN SATURATION: 99 % | RESPIRATION RATE: 25 BRPM | HEART RATE: 76 BPM | SYSTOLIC BLOOD PRESSURE: 129 MMHG | TEMPERATURE: 98.1 F

## 2017-05-22 VITALS
DIASTOLIC BLOOD PRESSURE: 56 MMHG | HEART RATE: 96 BPM | SYSTOLIC BLOOD PRESSURE: 126 MMHG | RESPIRATION RATE: 16 BRPM | OXYGEN SATURATION: 92 %

## 2017-05-22 VITALS
HEART RATE: 94 BPM | OXYGEN SATURATION: 94 % | RESPIRATION RATE: 29 BRPM | TEMPERATURE: 100.2 F | DIASTOLIC BLOOD PRESSURE: 52 MMHG | SYSTOLIC BLOOD PRESSURE: 114 MMHG

## 2017-05-22 VITALS
SYSTOLIC BLOOD PRESSURE: 143 MMHG | HEART RATE: 90 BPM | OXYGEN SATURATION: 98 % | RESPIRATION RATE: 27 BRPM | DIASTOLIC BLOOD PRESSURE: 77 MMHG

## 2017-05-22 VITALS
OXYGEN SATURATION: 97 % | SYSTOLIC BLOOD PRESSURE: 132 MMHG | RESPIRATION RATE: 29 BRPM | HEART RATE: 82 BPM | DIASTOLIC BLOOD PRESSURE: 70 MMHG

## 2017-05-22 VITALS
RESPIRATION RATE: 26 BRPM | HEART RATE: 74 BPM | OXYGEN SATURATION: 98 % | DIASTOLIC BLOOD PRESSURE: 62 MMHG | SYSTOLIC BLOOD PRESSURE: 111 MMHG

## 2017-05-22 VITALS
DIASTOLIC BLOOD PRESSURE: 46 MMHG | RESPIRATION RATE: 28 BRPM | HEART RATE: 92 BPM | OXYGEN SATURATION: 96 % | SYSTOLIC BLOOD PRESSURE: 95 MMHG

## 2017-05-22 VITALS
OXYGEN SATURATION: 95 % | SYSTOLIC BLOOD PRESSURE: 96 MMHG | RESPIRATION RATE: 28 BRPM | HEART RATE: 104 BPM | TEMPERATURE: 98.8 F | DIASTOLIC BLOOD PRESSURE: 72 MMHG

## 2017-05-22 VITALS
DIASTOLIC BLOOD PRESSURE: 46 MMHG | RESPIRATION RATE: 27 BRPM | SYSTOLIC BLOOD PRESSURE: 80 MMHG | HEART RATE: 88 BPM | OXYGEN SATURATION: 97 %

## 2017-05-22 VITALS
DIASTOLIC BLOOD PRESSURE: 56 MMHG | HEART RATE: 98 BPM | OXYGEN SATURATION: 97 % | SYSTOLIC BLOOD PRESSURE: 103 MMHG | RESPIRATION RATE: 25 BRPM

## 2017-05-22 VITALS
HEART RATE: 92 BPM | OXYGEN SATURATION: 94 % | SYSTOLIC BLOOD PRESSURE: 110 MMHG | DIASTOLIC BLOOD PRESSURE: 50 MMHG | RESPIRATION RATE: 19 BRPM

## 2017-05-22 VITALS
DIASTOLIC BLOOD PRESSURE: 65 MMHG | SYSTOLIC BLOOD PRESSURE: 122 MMHG | OXYGEN SATURATION: 96 % | HEART RATE: 112 BPM | RESPIRATION RATE: 30 BRPM

## 2017-05-22 VITALS
OXYGEN SATURATION: 92 % | HEART RATE: 108 BPM | DIASTOLIC BLOOD PRESSURE: 40 MMHG | RESPIRATION RATE: 30 BRPM | SYSTOLIC BLOOD PRESSURE: 79 MMHG

## 2017-05-22 VITALS — RESPIRATION RATE: 29 BRPM | OXYGEN SATURATION: 93 % | HEART RATE: 108 BPM

## 2017-05-22 VITALS
HEART RATE: 92 BPM | OXYGEN SATURATION: 95 % | RESPIRATION RATE: 26 BRPM | DIASTOLIC BLOOD PRESSURE: 67 MMHG | SYSTOLIC BLOOD PRESSURE: 135 MMHG

## 2017-05-22 VITALS — HEART RATE: 92 BPM | OXYGEN SATURATION: 97 % | RESPIRATION RATE: 29 BRPM | TEMPERATURE: 99.7 F

## 2017-05-22 VITALS
DIASTOLIC BLOOD PRESSURE: 42 MMHG | HEART RATE: 92 BPM | SYSTOLIC BLOOD PRESSURE: 79 MMHG | RESPIRATION RATE: 28 BRPM | OXYGEN SATURATION: 97 %

## 2017-05-22 VITALS
SYSTOLIC BLOOD PRESSURE: 99 MMHG | RESPIRATION RATE: 27 BRPM | OXYGEN SATURATION: 98 % | DIASTOLIC BLOOD PRESSURE: 58 MMHG | HEART RATE: 76 BPM

## 2017-05-22 VITALS — HEART RATE: 90 BPM | OXYGEN SATURATION: 97 % | RESPIRATION RATE: 29 BRPM

## 2017-05-22 VITALS
HEART RATE: 122 BPM | OXYGEN SATURATION: 94 % | DIASTOLIC BLOOD PRESSURE: 63 MMHG | RESPIRATION RATE: 28 BRPM | SYSTOLIC BLOOD PRESSURE: 74 MMHG

## 2017-05-22 VITALS
RESPIRATION RATE: 30 BRPM | HEART RATE: 110 BPM | DIASTOLIC BLOOD PRESSURE: 37 MMHG | SYSTOLIC BLOOD PRESSURE: 73 MMHG | OXYGEN SATURATION: 96 %

## 2017-05-22 VITALS
HEART RATE: 88 BPM | RESPIRATION RATE: 26 BRPM | SYSTOLIC BLOOD PRESSURE: 140 MMHG | DIASTOLIC BLOOD PRESSURE: 55 MMHG | OXYGEN SATURATION: 93 %

## 2017-05-22 VITALS
DIASTOLIC BLOOD PRESSURE: 39 MMHG | SYSTOLIC BLOOD PRESSURE: 66 MMHG | RESPIRATION RATE: 24 BRPM | OXYGEN SATURATION: 96 % | HEART RATE: 114 BPM

## 2017-05-22 VITALS
RESPIRATION RATE: 22 BRPM | DIASTOLIC BLOOD PRESSURE: 41 MMHG | OXYGEN SATURATION: 93 % | SYSTOLIC BLOOD PRESSURE: 79 MMHG | HEART RATE: 110 BPM

## 2017-05-22 VITALS
OXYGEN SATURATION: 95 % | DIASTOLIC BLOOD PRESSURE: 52 MMHG | RESPIRATION RATE: 26 BRPM | SYSTOLIC BLOOD PRESSURE: 109 MMHG | HEART RATE: 98 BPM

## 2017-05-22 VITALS
DIASTOLIC BLOOD PRESSURE: 60 MMHG | SYSTOLIC BLOOD PRESSURE: 134 MMHG | OXYGEN SATURATION: 91 % | HEART RATE: 92 BPM | RESPIRATION RATE: 25 BRPM

## 2017-05-22 VITALS
SYSTOLIC BLOOD PRESSURE: 140 MMHG | DIASTOLIC BLOOD PRESSURE: 53 MMHG | OXYGEN SATURATION: 95 % | HEART RATE: 100 BPM | RESPIRATION RATE: 31 BRPM

## 2017-05-22 VITALS
HEART RATE: 90 BPM | OXYGEN SATURATION: 97 % | DIASTOLIC BLOOD PRESSURE: 49 MMHG | SYSTOLIC BLOOD PRESSURE: 86 MMHG | RESPIRATION RATE: 19 BRPM

## 2017-05-22 VITALS — OXYGEN SATURATION: 97 % | HEART RATE: 80 BPM | RESPIRATION RATE: 29 BRPM

## 2017-05-22 VITALS
RESPIRATION RATE: 29 BRPM | DIASTOLIC BLOOD PRESSURE: 42 MMHG | HEART RATE: 108 BPM | OXYGEN SATURATION: 92 % | SYSTOLIC BLOOD PRESSURE: 90 MMHG

## 2017-05-22 VITALS
TEMPERATURE: 99.1 F | SYSTOLIC BLOOD PRESSURE: 141 MMHG | HEART RATE: 96 BPM | DIASTOLIC BLOOD PRESSURE: 70 MMHG | OXYGEN SATURATION: 97 % | RESPIRATION RATE: 16 BRPM

## 2017-05-22 VITALS — OXYGEN SATURATION: 98 % | HEART RATE: 76 BPM | RESPIRATION RATE: 30 BRPM

## 2017-05-22 VITALS — OXYGEN SATURATION: 97 % | HEART RATE: 92 BPM | RESPIRATION RATE: 15 BRPM

## 2017-05-22 VITALS
HEART RATE: 108 BPM | SYSTOLIC BLOOD PRESSURE: 132 MMHG | DIASTOLIC BLOOD PRESSURE: 49 MMHG | OXYGEN SATURATION: 93 % | RESPIRATION RATE: 20 BRPM

## 2017-05-22 VITALS — RESPIRATION RATE: 26 BRPM | HEART RATE: 78 BPM | OXYGEN SATURATION: 98 %

## 2017-05-22 VITALS
DIASTOLIC BLOOD PRESSURE: 54 MMHG | OXYGEN SATURATION: 91 % | RESPIRATION RATE: 8 BRPM | SYSTOLIC BLOOD PRESSURE: 126 MMHG | HEART RATE: 96 BPM

## 2017-05-22 VITALS
HEART RATE: 94 BPM | OXYGEN SATURATION: 94 % | DIASTOLIC BLOOD PRESSURE: 51 MMHG | SYSTOLIC BLOOD PRESSURE: 125 MMHG | RESPIRATION RATE: 28 BRPM

## 2017-05-22 VITALS
OXYGEN SATURATION: 95 % | DIASTOLIC BLOOD PRESSURE: 54 MMHG | SYSTOLIC BLOOD PRESSURE: 83 MMHG | HEART RATE: 84 BPM | RESPIRATION RATE: 18 BRPM

## 2017-05-22 VITALS — OXYGEN SATURATION: 96 % | RESPIRATION RATE: 30 BRPM | HEART RATE: 94 BPM

## 2017-05-22 VITALS — OXYGEN SATURATION: 97 %

## 2017-05-22 VITALS
RESPIRATION RATE: 22 BRPM | HEART RATE: 90 BPM | SYSTOLIC BLOOD PRESSURE: 114 MMHG | DIASTOLIC BLOOD PRESSURE: 58 MMHG | TEMPERATURE: 101.1 F | OXYGEN SATURATION: 95 %

## 2017-05-22 VITALS
SYSTOLIC BLOOD PRESSURE: 116 MMHG | RESPIRATION RATE: 26 BRPM | HEART RATE: 96 BPM | OXYGEN SATURATION: 93 % | TEMPERATURE: 100.1 F | DIASTOLIC BLOOD PRESSURE: 58 MMHG

## 2017-05-22 VITALS — OXYGEN SATURATION: 95 %

## 2017-05-22 VITALS
SYSTOLIC BLOOD PRESSURE: 93 MMHG | OXYGEN SATURATION: 96 % | RESPIRATION RATE: 29 BRPM | DIASTOLIC BLOOD PRESSURE: 47 MMHG | HEART RATE: 92 BPM

## 2017-05-22 VITALS
SYSTOLIC BLOOD PRESSURE: 113 MMHG | RESPIRATION RATE: 27 BRPM | OXYGEN SATURATION: 98 % | DIASTOLIC BLOOD PRESSURE: 77 MMHG | HEART RATE: 78 BPM

## 2017-05-22 VITALS — OXYGEN SATURATION: 97 % | RESPIRATION RATE: 28 BRPM | HEART RATE: 94 BPM

## 2017-05-22 VITALS
OXYGEN SATURATION: 97 % | RESPIRATION RATE: 29 BRPM | DIASTOLIC BLOOD PRESSURE: 43 MMHG | SYSTOLIC BLOOD PRESSURE: 81 MMHG | HEART RATE: 92 BPM

## 2017-05-22 VITALS
SYSTOLIC BLOOD PRESSURE: 100 MMHG | OXYGEN SATURATION: 96 % | RESPIRATION RATE: 26 BRPM | HEART RATE: 78 BPM | DIASTOLIC BLOOD PRESSURE: 57 MMHG

## 2017-05-22 VITALS — HEART RATE: 84 BPM | RESPIRATION RATE: 18 BRPM | OXYGEN SATURATION: 96 %

## 2017-05-22 VITALS — HEART RATE: 94 BPM | OXYGEN SATURATION: 95 % | RESPIRATION RATE: 29 BRPM

## 2017-05-22 LAB
ALP SERPL-CCNC: 65 U/L (ref 45–117)
ALT SERPL-CCNC: 22 U/L (ref 12–78)
ANION GAP SERPL CALC-SCNC: 11 MEQ/L (ref 5–15)
AST SERPL-CCNC: 25 U/L (ref 15–37)
BASOPHILS # BLD AUTO: 0 TH/MM3 (ref 0–0.2)
BASOPHILS NFR BLD: 0 % (ref 0–2)
BILIRUB SERPL-MCNC: 0.5 MG/DL (ref 0.2–1)
BUN SERPL-MCNC: 28 MG/DL (ref 7–18)
CHLORIDE SERPL-SCNC: 102 MEQ/L (ref 98–107)
CK SERPL-CCNC: 212 U/L (ref 39–308)
EOSINOPHIL # BLD: 0 TH/MM3 (ref 0–0.4)
EOSINOPHIL NFR BLD: 0.2 % (ref 0–4)
ERYTHROCYTE [DISTWIDTH] IN BLOOD BY AUTOMATED COUNT: 16.8 % (ref 11.6–17.2)
ERYTHROCYTE [DISTWIDTH] IN BLOOD BY AUTOMATED COUNT: 17.5 % (ref 11.6–17.2)
GFR SERPLBLD BASED ON 1.73 SQ M-ARVRAT: 41 ML/MIN (ref 89–?)
HCO3 BLD-SCNC: 23.4 MEQ/L (ref 21–32)
HCT VFR BLD CALC: 28.9 % (ref 39–51)
HCT VFR BLD CALC: 29.7 % (ref 39–51)
HEMO FLAGS: (no result)
LYMPHOCYTES # BLD AUTO: 0.3 TH/MM3 (ref 1–4.8)
LYMPHOCYTES NFR BLD AUTO: 2.1 % (ref 9–44)
MAGNESIUM SERPL-MCNC: 1.7 MG/DL (ref 1.5–2.5)
MCH RBC QN AUTO: 26 PG (ref 27–34)
MCH RBC QN AUTO: 26.9 PG (ref 27–34)
MCHC RBC AUTO-ENTMCNC: 32.1 % (ref 32–36)
MCHC RBC AUTO-ENTMCNC: 32.6 % (ref 32–36)
MCV RBC AUTO: 80.9 FL (ref 80–100)
MCV RBC AUTO: 82.5 FL (ref 80–100)
MONOCYTES NFR BLD: 0.7 % (ref 0–8)
NEUTROPHILS # BLD AUTO: 13.3 TH/MM3 (ref 1.8–7.7)
NEUTROPHILS NFR BLD AUTO: 97 % (ref 16–70)
PLATELET # BLD: 168 TH/MM3 (ref 150–450)
PLATELET # BLD: 169 TH/MM3 (ref 150–450)
POTASSIUM SERPL-SCNC: 3.3 MEQ/L (ref 3.5–5.1)
RBC # BLD AUTO: 3.5 MIL/MM3 (ref 4.5–5.9)
RBC # BLD AUTO: 3.67 MIL/MM3 (ref 4.5–5.9)
REVIEW FLAG: (no result)
SODIUM SERPL-SCNC: 136 MEQ/L (ref 136–145)
WBC # BLD AUTO: 13.7 TH/MM3 (ref 4–11)
WBC # BLD AUTO: 16.6 TH/MM3 (ref 4–11)

## 2017-05-22 RX ADMIN — OXYCODONE HYDROCHLORIDE AND ACETAMINOPHEN SCH MG: 500 TABLET ORAL at 20:41

## 2017-05-22 RX ADMIN — PHENYTOIN SODIUM SCH MLS/HR: 50 INJECTION INTRAMUSCULAR; INTRAVENOUS at 05:35

## 2017-05-22 RX ADMIN — OXYCODONE HYDROCHLORIDE AND ACETAMINOPHEN SCH MG: 500 TABLET ORAL at 09:42

## 2017-05-22 RX ADMIN — PHENYTOIN SODIUM SCH MLS/HR: 50 INJECTION INTRAMUSCULAR; INTRAVENOUS at 14:06

## 2017-05-22 RX ADMIN — IPRATROPIUM BROMIDE AND ALBUTEROL SULFATE SCH AMPULE: .5; 3 SOLUTION RESPIRATORY (INHALATION) at 07:38

## 2017-05-22 RX ADMIN — BUDESONIDE AND FORMOTEROL FUMARATE DIHYDRATE SCH PUFF: 160; 4.5 AEROSOL RESPIRATORY (INHALATION) at 09:42

## 2017-05-22 RX ADMIN — IPRATROPIUM BROMIDE AND ALBUTEROL SULFATE SCH AMPULE: .5; 3 SOLUTION RESPIRATORY (INHALATION) at 14:04

## 2017-05-22 RX ADMIN — PROMETHAZINE HYDROCHLORIDE PRN MG: 25 TABLET ORAL at 00:26

## 2017-05-22 RX ADMIN — Medication SCH ML: at 09:43

## 2017-05-22 RX ADMIN — INSULIN ASPART SCH: 100 INJECTION, SOLUTION INTRAVENOUS; SUBCUTANEOUS at 17:56

## 2017-05-22 RX ADMIN — CEFTRIAXONE SODIUM SCH MLS/HR: 2 INJECTION, POWDER, FOR SOLUTION INTRAMUSCULAR; INTRAVENOUS at 17:56

## 2017-05-22 RX ADMIN — TOLTERODINE TARTRATE SCH MG: 4 CAPSULE, EXTENDED RELEASE ORAL at 09:39

## 2017-05-22 RX ADMIN — ASPIRIN SCH MG: 81 TABLET ORAL at 09:41

## 2017-05-22 RX ADMIN — INSULIN ASPART SCH: 100 INJECTION, SOLUTION INTRAVENOUS; SUBCUTANEOUS at 00:00

## 2017-05-22 RX ADMIN — INSULIN ASPART SCH: 100 INJECTION, SOLUTION INTRAVENOUS; SUBCUTANEOUS at 20:00

## 2017-05-22 RX ADMIN — ACETAMINOPHEN PRN MG: 325 TABLET ORAL at 04:07

## 2017-05-22 RX ADMIN — IPRATROPIUM BROMIDE AND ALBUTEROL SULFATE SCH AMPULE: .5; 3 SOLUTION RESPIRATORY (INHALATION) at 19:39

## 2017-05-22 RX ADMIN — IPRATROPIUM BROMIDE AND ALBUTEROL SULFATE PRN AMPULE: .5; 3 SOLUTION RESPIRATORY (INHALATION) at 00:38

## 2017-05-22 RX ADMIN — INSULIN ASPART SCH: 100 INJECTION, SOLUTION INTRAVENOUS; SUBCUTANEOUS at 04:36

## 2017-05-22 RX ADMIN — POTASSIUM CHLORIDE PRN MLS/HR: 400 INJECTION, SOLUTION INTRAVENOUS at 09:42

## 2017-05-22 RX ADMIN — PHENYTOIN SODIUM SCH MLS/HR: 50 INJECTION INTRAMUSCULAR; INTRAVENOUS at 22:51

## 2017-05-22 RX ADMIN — INSULIN ASPART SCH: 100 INJECTION, SOLUTION INTRAVENOUS; SUBCUTANEOUS at 12:00

## 2017-05-22 RX ADMIN — TAZOBACTAM SODIUM AND PIPERACILLIN SODIUM SCH MLS/HR: 500; 4 INJECTION, SOLUTION INTRAVENOUS at 01:38

## 2017-05-22 RX ADMIN — ACETAMINOPHEN PRN MG: 325 TABLET ORAL at 09:42

## 2017-05-22 RX ADMIN — PRAVASTATIN SODIUM SCH MG: 40 TABLET ORAL at 20:41

## 2017-05-22 RX ADMIN — PANTOPRAZOLE SODIUM SCH MG: 20 TABLET, DELAYED RELEASE ORAL at 09:42

## 2017-05-22 RX ADMIN — Medication SCH ML: at 20:45

## 2017-05-22 RX ADMIN — ENOXAPARIN SODIUM SCH MG: 40 INJECTION SUBCUTANEOUS at 09:39

## 2017-05-22 RX ADMIN — CHLORHEXIDINE GLUCONATE SCH PACK: 500 CLOTH TOPICAL at 03:02

## 2017-05-22 RX ADMIN — INSULIN ASPART SCH: 100 INJECTION, SOLUTION INTRAVENOUS; SUBCUTANEOUS at 09:41

## 2017-05-22 RX ADMIN — BUDESONIDE AND FORMOTEROL FUMARATE DIHYDRATE SCH PUFF: 160; 4.5 AEROSOL RESPIRATORY (INHALATION) at 20:41

## 2017-05-22 NOTE — PD.CONS
History of Present Illness


Service


Infectious disease


Consult Requested By


Dr Mckeon


Reason for Consult


GNR septic shock


Primary Care Physician


Humza Hernandez MD


Diagnoses:  


(1) Severe sepsis


(2) UTI (urinary tract infection)


(3) Acute kidney failure


(4) Neurogenic bladder


(5) Type 2 diabetes mellitus


History of Present Illness


Patient with known h/o Quadriparesis following a cervical spine injury in 2007 

- came in with severe chills associated with fever and some shortness of breath 

and cough. Patient does catheterize because of a neurogenic bladder and says he 

knew he had a UTI but culture done prior to admission grew mixed pauline and so 

he was not on any antibiotics. He feels better  today though he still has low 

grade fevers. He says he frequently passes kidney stones.





Review of Systems


Constitutional:  COMPLAINS OF: Fever, Chills


Endocrine:  DENIES: Polydipsia


Eyes:  DENIES: Blurred vision, Diplopia


Ears, nose, mouth, throat:  DENIES: Hearing loss, Vertigo, Nasal discharge


Respiratory:  COMPLAINS OF: Shortness of breath,  DENIES: Cough


Cardiovascular:  DENIES: Chest pain, Palpitations


Gastrointestinal:  DENIES: Abdominal pain, Diarrhea


Genitourinary:  DENIES: Testicular Pain


Musculoskeletal:  DENIES: Neck pain


Integumentary:  DENIES: Rash


Neurologic:  COMPLAINS OF: Localized weakness


Psychiatric:  DENIES: Confusion





Past Family Social History


Allergies:  


Coded Allergies:  


     Morphine (Verified  Allergy, Severe, 5/20/17)


 HALLUCINATIONS


     Sulfa (Verified  Allergy, Severe, 5/20/17)


 SKIN RASH


     *MDRO Multi-Drug Resistant Organism (Verified  Allergy, Unknown, 5/22/17)


 MRSA PCR Positive 5/20/17


 MRSA 2008


 Enterococcus faecium VRE 2007


 Carbapenem-resistant Acinetobacter baumannii 2007


Past Medical History


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


Neurogenic bladder


Type 2 diabetes


Hypertension


Dyslipidemia


Obstructive sleep apnea


Asthma COPD


Kidney stones


Past Surgical History


Cervical spine fusion in 2007


Colostomy in 2007


Gastric bypass surgery in 2014


Active Ordered Medications


IV Vancomycin


IV Levofloxacin


IV Meropenem


Social History


No current smoking or alcohol use





Physical Exam


Vital Signs





 Vital Signs








  Date Time  Temp Pulse Resp B/P Pulse Ox O2 Delivery O2 Flow Rate FiO2


 


5/22/17 13:46  76 28 100/59 98   


 


5/22/17 13:46  76      


 


5/22/17 13:45  76 30  98   


 


5/22/17 13:45  76      


 


5/22/17 13:31  76      


 


5/22/17 13:31  76 27 99/58 98   


 


5/22/17 13:30  78      


 


5/22/17 13:30  78 26  98   


 


5/22/17 13:16  78      


 


5/22/17 13:16  78 26 100/57 96   


 


5/22/17 13:15  80 29  97   


 


5/22/17 13:15  80      


 


5/22/17 13:01  84 18 83/54 95   


 


5/22/17 13:01  84      


 


5/22/17 13:00  84 18  96   


 


5/22/17 13:00  84      


 


5/22/17 12:46  90 19 86/49 97   


 


5/22/17 12:46  90      


 


5/22/17 12:45  92 15  97   


 


5/22/17 12:45  92      


 


5/22/17 12:31  88 27 80/46 97   


 


5/22/17 12:31  88      


 


5/22/17 12:30  90      


 


5/22/17 12:30  90 29  97   


 


5/22/17 12:16  92 28 79/42 97   


 


5/22/17 12:16  92      


 


5/22/17 12:15  94 28  97   


 


5/22/17 12:15  94      


 


5/22/17 12:01  92      


 


5/22/17 12:01  92 29 81/43 97   


 


5/22/17 12:00  92      


 


5/22/17 12:00 99.7 92 29  97   


 


5/22/17 11:46  92      


 


5/22/17 11:46  92 29 93/47 96   


 


5/22/17 11:45  94      


 


5/22/17 11:45  94 30  96   


 


5/22/17 11:31  92      


 


5/22/17 11:31  92 28 95/46 96   


 


5/22/17 11:30  94 29  95   


 


5/22/17 11:30  94      


 


5/22/17 11:07  110 22 79/41 93   


 


5/22/17 11:02  114 24 66/39 96   


 


5/22/17 11:00  110 30 73/37 96   


 


5/22/17 10:45  122 28 74/63 94   


 


5/22/17 10:30  108 30 79/40 92   


 


5/22/17 10:26  108 29 90/42 92   


 


5/22/17 10:15  108 29  93   


 


5/22/17 10:00  100      


 


5/22/17 10:00  100 11 118/57 92   


 


5/22/17 09:45  108 20 132/49 93   


 


5/22/17 09:30  96 16 126/56 92   


 


5/22/17 09:15  96 8 126/54 91   


 


5/22/17 09:00  98 16 141/55 93   


 


5/22/17 08:45  94 28 125/51 94   


 


5/22/17 08:30  94 28 137/55 95   


 


5/22/17 08:15  92 25 134/60 91   


 


5/22/17 08:00  98 26 109/52 95   


 


5/22/17 08:00  98      


 


5/22/17 07:45  96 28 92/69 95   


 


5/22/17 07:43     95 Nasal Cannula 2.00 


 


5/22/17 07:30  100 31 140/53 95   


 


5/22/17 07:15  88 26 140/55 93   


 


5/22/17 07:00 100.1 96 26 116/58 93   


 


5/22/17 06:00  86      


 


5/22/17 06:00 99.7 86 30 111/57 95   


 


5/22/17 05:00 100.2 94 29 114/52 94   


 


5/22/17 04:00  90      


 


5/22/17 04:00 101.1 90 22 114/58 95   


 


5/22/17 03:00  92 19 110/50 94   


 


5/22/17 02:00  96      


 


5/22/17 02:00  96 24 92/47 96   


 


5/22/17 01:00  112      


 


5/22/17 01:00  112 30 122/65 96   


 


5/22/17 00:53     97  2.00 


 


5/22/17 00:00 98.8 104 28 96/72 95   


 


5/22/17 00:00  104      


 


5/21/17 23:00  90 27 102/67 96   


 


5/21/17 22:00  82 23 104/67 98   


 


5/21/17 22:00  82      


 


5/21/17 21:00  78 17 107/61 98   


 


5/21/17 20:00 100.0 80  100/51 99   


 


5/21/17 20:00  80      


 


5/21/17 19:33     98 Nasal Cannula 2.00 


 


5/21/17 19:00  96  65/42 96   


 


5/21/17 18:00 100.9 96 23 95/65 99   


 


5/21/17 18:00  100      








Physical Exam


GENERAL: This is a chronically ill, patient with quadriparesis, in no apparent 

distress.


SKIN: No rashes, ecchymoses or lesions. Cool and dry.


HEAD: Atraumatic. Normocephalic. No temporal or scalp tenderness.


EYES: Pupils equal round and reactive. Extraocular motions intact. No scleral 

icterus. No injection or drainage. 


ENT: Nose without bleeding, purulent drainage or septal hematoma. Throat 

without erythema, tonsillar hypertrophy or exudate. Uvula midline. Airway 

patent.


NECK: Trachea midline. No JVD or lymphadenopathy. Supple, nontender, no 

meningeal signs.


CARDIOVASCULAR: Regular rate and rhythm without murmurs, gallops, or rubs. 


RESPIRATORY: Clear to auscultation. Breath sounds equal bilaterally. No wheezes

, rales, or rhonchi.  


GASTROINTESTINAL: Abdomen soft, non-tender, nondistended. No hepato-splenomegaly

, or palpable masses. No guarding.


MUSCULOSKELETAL: Extremities without clubbing, cyanosis, or edema. No joint 

tenderness, effusion, or edema noted. No calf tenderness. Negative Homans sign 

bilaterally.


NEUROLOGICAL: Awake and alert. Cranial nerves II through XII intact.  Weakness 

of arms and legs with severe muscle wasting


Laboratory





Laboratory Tests








Test 5/22/17 5/22/17





 04:30 09:30


 


White Blood Count 16.6  13.7 


 


Red Blood Count 3.50  3.67 


 


Hemoglobin 9.4  9.5 


 


Hematocrit 28.9  29.7 


 


Mean Corpuscular Volume 82.5  80.9 


 


Mean Corpuscular Hemoglobin 26.9  26.0 


 


Mean Corpuscular Hemoglobin 32.6  32.1 





Concent  


 


Red Cell Distribution Width 17.5  16.8 


 


Platelet Count 168  169 


 


Mean Platelet Volume 8.7  8.6 


 


Sodium Level 136  


 


Potassium Level 3.3  


 


Chloride Level 102  


 


Carbon Dioxide Level 23.4  


 


Anion Gap 11  


 


Blood Urea Nitrogen 28  


 


Creatinine 1.70  


 


Estimat Glomerular Filtration 41  





Rate  


 


Random Glucose 247  


 


Lactic Acid Level 2.7  


 


Calcium Level 7.9  


 


Phosphorus Level 1.2  


 


Magnesium Level 1.7  


 


Total Bilirubin 0.5  


 


Aspartate Amino Transf 25  





(AST/SGOT)  


 


Alanine Aminotransferase 22  





(ALT/SGPT)  


 


Alkaline Phosphatase 65  


 


Total Creatine Kinase 212  


 


Total Protein 6.1  


 


Albumin 2.2  


 


Random Vancomycin Level 20.1  


 


Neutrophils (%) (Auto)  97.0 


 


Lymphocytes (%) (Auto)  2.1 


 


Monocytes (%) (Auto)  0.7 


 


Eosinophils (%) (Auto)  0.2 


 


Basophils (%) (Auto)  0.0 


 


Neutrophils # (Auto)  13.3 


 


Lymphocytes # (Auto)  0.3 


 


Monocytes # (Auto)  0.1 


 


Eosinophils # (Auto)  0.0 


 


Basophils # (Auto)  0.0 


 


CBC Comment  DIFF FINAL 


 


Differential Comment   














 Date/Time Procedure Status





Source Growth 


 


 5/22/17 10:30 Aerobic Blood Culture Received





Blood Other Pending 





 5/22/17 10:30 Anaerobic Blood Culture Received





Blood Other Pending 


 


 5/20/17 19:20 Urine Culture - Final Complete





Urine Catheterized Urine Escherichia Coli 





 Proteus Mirabilis 


 


 5/20/17 18:55 Aerobic Blood Culture - Preliminary Resulted





Blood Peripheral NO GROWTH IN 2 DAYS 





 5/20/17 18:55 Anaerobic Blood Culture - Preliminary Resulted





Blood Peripheral NO GROWTH IN 2 DAYS 








Result Diagram:  


5/22/17 0930                                                                   

             5/22/17 0430








Assessment and Plan


Problem List:  


(1) Severe sepsis


Status:  Acute


Plan:  Follow blood and urine cultures


Blood culture with 1 bottle with E coli


Urine culture > [id number] E coli and some Proteus


Stop IV Vancomycin


Stop IV Levofloxacin


Stop IV Meropenem


Start IV Ceftriaxone 2 g daily





(2) Septicemia due to Escherichia coli


Status:  Acute


(3) UTI (urinary tract infection)


Status:  Acute


(4) Neurogenic bladder


Status:  Acute


(5) Type 2 diabetes mellitus


Status:  Acute


(6) Acute kidney failure


Status:  Acute





Problem Qualifiers





(1) UTI (urinary tract infection):  


Qualified Code:  N10 - Acute pyelonephritis


(2) Type 2 diabetes mellitus:  





Cesilia Carnes MD May 22, 2017 16:43

## 2017-05-22 NOTE — HHI.CCPN
Subjective


Remarks/Hospital Course


Patient is a 67-year-old  male with past medical history of incomplete 

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 

self-catheterization, type 2 diabetes, hypertension, COPD/asthma, obstructive 

sleep apnea and obesity who presented to the [facility] emergency department 

with cough congestion, fever chills and weakness.  Patient states the symptoms 

started 5/19/17.  Cough is nonproductive.  Patient also feels that he may be 

having a urinary tract infection as he has seen color change, urine was bloody 

and somewhat cloudy.  In the ER patient had a blood pressure of 93/64, MAXIMUM 

TEMPERATURE was 103.  Lab workup showed that white count was 17.4, lactic acid 

2.4.  BUN was 27 creatinine 1.6 his previous creatinine 2015 was 0.62.  UA 

showed evidence of UTI.  Patient was admitted to critical care service to the 

ICU.  He received 2 L IV fluid boluses and also was placed on vancomycin and 

azithromycin and Zosyn. I evaluated the patient in ICU.  He appears ill but 

improving.  Blood pressure has stabilized.  He is making urine.  Fever trending 

down





SUBJ 5/22: Became hypotensive tachycardic yesterday evening with fever up to 

104.  Central line was placed, Levaquin added and single dose of gentamicin.  

Currently on vasopressin.  Mike fever 101 at 4 AM.  White count increasing 

16.6 today, creatinine stable at 1.7. UO 2L in 24 hour. Bld cx E Coli, urine cx 

GNR.  As the patient is still in septic shock, stopped Zosyn start meropenem.  

ID consult pending





Objective





 Vital Signs








  Date Time  Temp Pulse Resp B/P Pulse Ox O2 Delivery O2 Flow Rate FiO2


 


5/22/17 06:00  86      


 


5/22/17 06:00 99.7  30 111/57 95   


 


5/22/17 00:53       2.00 


 


5/21/17 19:33      Nasal Cannula  


 


5/20/17 20:38        21








 Intake and Output








 5/21/17 5/21/17 5/22/17





 08:00 16:00 00:00


 


Intake Total 2111 ml 3825 ml 5130 ml


 


Output Total 850 ml 1250 ml 425 ml


 


Balance 1261 ml 2575 ml 4705 ml








Result Diagram:  


5/22/17 0430                                                                   

             5/22/17 0430





Imaging


CXR Mild bibasilar atelectasis


Objective Remarks


GENERAL: Well-nourished, well-developed patient.  Appears critically ill.  On 

vasopressin


SKIN: Skin warm and dry


HEAD: Normocephalic.


EYES: No scleral icterus. No injection or drainage. 


NECK: Supple, trachea midline. No JVD or lymphadenopathy.


CARDIOVASCULAR: Tachycardic rate and rhythm without murmurs, gallops, or rubs. 


RESPIRATORY: Breath sounds equal bilaterally. No accessory muscle use.


GASTROINTESTINAL: Abdomen soft, non-tender, nondistended.  Colostomy in place.


MUSCULOSKELETAL: No cyanosis, or edema. 


BACK: Nontender without obvious deformity.  Well-healed sacral decubitus ulcer


NEURO: Alert awake oriented.  Patient is quadriplegic C5 C7 incomplete. 4/5 

muscle strength upper extremity, 1/5 in [place], neurogenic bladder


Urinary Catheter:  Yes


Assessment to:  Continue





A/P


Assessment and Plan


NEURO: 


Incomplete quadriplegia from cervical spine injury


Neurogenic bladder


-Minimize sedation, hold home meds (gabapentin, desipramine and baclofen)


-PT evaluation and treatment





RESP: 


History of COPD asthma


Obstructive sleep apnea


-Nasal cannula oxygen


-DuoNeb every 6 hours and when necessary


-Use home CPAP





CV: 


Septic shock


Lactic acidemia 


-Normal saline IV fluids 5L boluses since admission and 125 ml per hour


-Lactic acid at 2.7


-Holding home antihypertensives, continue aspirin


-Vasopressin infusion to keep map above 65





GI: 


Status post colostomy


-Heart healthy diet. PPI


-Colostomy management





: 


Acute kidney failure secondary to sepsis and dehydration 


Neurogenic bladder


-Monitor renal function closely. Lira catheter. 


-IVF as above


-Check renal ultrasound





ID: 


Septic shock


Escherichia coli bacteremia


UTI 


-Follow-up on blood urine cultures, sputum culture if available.  Source of 

sepsis seems to be UTI 


-Continue IV vancomycin and Levaquin.  DC Zosyn start meropenem


-Single dose of gentamicin given yesterday


-ID consult pending





HEME: 


-Monitor CBC, CMP, coags





ENDO: 


Type 2 diabetes


Hyperglycemia


-Sliding-scale insulin


-Electrolyte replacement per protocol





PROPH: 


-Bilateral lower extremity SCDs. Lovenox 40 mg sq daily. PPI 





LINES:


-Utilize peripheral IVs, central line if needed





CC time 45 min








Rico Mckeon MD May 22, 2017 06:45

## 2017-05-22 NOTE — RADHPO
EXAM DATE/TIME:  05/22/2017 08:28 

 

[facility] COMPARISON:     

No previous studies available for comparison.

        

 

 

INDICATIONS :     

Increased BUN/creatinine. 

                     

 

MEDICAL HISTORY :     

Hypercholesterolemia. Hernia, hiatal. Renal calculi. Cervical C6-7 injury. Head  trauma. HTN. COPD. A
sthma. Sleep apnea. GERD.  UTI. Quadraplegic. Self catheterization. Type II diabetes. Cdiff. MRSA. 

 

SURGICAL HISTORY :     

Colostomy.     Cervical fusion. Gastric bypass. Left rotator cuff repair. Plastic skin flap. 

 

ENCOUNTER:     

Initial

 

ACUITY:     

1 day

 

PAIN SCORE:     

0/10

 

LOCATION:     

Bilateral flank 

MEASUREMENTS:     

 

RIGHT KIDNEY:     

13.2 x 6.1 x 8.5 cm

 

LEFT KIDNEY:     

14.8 x 5.9 x 6.7 cm

 

FINDINGS:     

 

RIGHT KIDNEY:     

Tiny stone in the lower pole collecting system. No evidence of hydronephrosis. Grossly normal cortica
l thickness and echogenicity.

 

LEFT KIDNEY:     

Mild hydronephrosis. Several stones, largest a slightly greater than 1 cm stone in the central collec
ting system. Grossly normal cortical thickness and echogenicity.

 

BLADDER:     

Decompressed with Lira catheter present.

 

CONCLUSION:     

Several left kidney stones and mild hydronephrosis. Recommend stone protocol CT for further evaluatio
nRadha Watts MD on May 22, 2017 at 9:51           

Board Certified Radiologist.

 This report was verified electronically.

## 2017-05-23 VITALS — RESPIRATION RATE: 24 BRPM | HEART RATE: 108 BPM | OXYGEN SATURATION: 96 %

## 2017-05-23 VITALS
OXYGEN SATURATION: 94 % | RESPIRATION RATE: 30 BRPM | DIASTOLIC BLOOD PRESSURE: 51 MMHG | SYSTOLIC BLOOD PRESSURE: 98 MMHG | HEART RATE: 112 BPM | TEMPERATURE: 100.7 F

## 2017-05-23 VITALS
RESPIRATION RATE: 21 BRPM | DIASTOLIC BLOOD PRESSURE: 57 MMHG | OXYGEN SATURATION: 97 % | HEART RATE: 92 BPM | SYSTOLIC BLOOD PRESSURE: 91 MMHG

## 2017-05-23 VITALS
OXYGEN SATURATION: 98 % | HEART RATE: 80 BPM | DIASTOLIC BLOOD PRESSURE: 65 MMHG | RESPIRATION RATE: 23 BRPM | SYSTOLIC BLOOD PRESSURE: 107 MMHG

## 2017-05-23 VITALS
HEART RATE: 106 BPM | RESPIRATION RATE: 27 BRPM | OXYGEN SATURATION: 95 % | SYSTOLIC BLOOD PRESSURE: 109 MMHG | DIASTOLIC BLOOD PRESSURE: 55 MMHG

## 2017-05-23 VITALS
HEART RATE: 72 BPM | OXYGEN SATURATION: 99 % | DIASTOLIC BLOOD PRESSURE: 66 MMHG | SYSTOLIC BLOOD PRESSURE: 110 MMHG | RESPIRATION RATE: 24 BRPM

## 2017-05-23 VITALS
DIASTOLIC BLOOD PRESSURE: 66 MMHG | SYSTOLIC BLOOD PRESSURE: 113 MMHG | OXYGEN SATURATION: 100 % | RESPIRATION RATE: 18 BRPM | HEART RATE: 74 BPM

## 2017-05-23 VITALS — OXYGEN SATURATION: 97 % | HEART RATE: 84 BPM | RESPIRATION RATE: 23 BRPM

## 2017-05-23 VITALS
RESPIRATION RATE: 28 BRPM | TEMPERATURE: 101 F | SYSTOLIC BLOOD PRESSURE: 115 MMHG | OXYGEN SATURATION: 97 % | DIASTOLIC BLOOD PRESSURE: 64 MMHG | HEART RATE: 110 BPM

## 2017-05-23 VITALS — OXYGEN SATURATION: 95 % | TEMPERATURE: 100.5 F | HEART RATE: 104 BPM | RESPIRATION RATE: 27 BRPM

## 2017-05-23 VITALS
SYSTOLIC BLOOD PRESSURE: 123 MMHG | RESPIRATION RATE: 22 BRPM | DIASTOLIC BLOOD PRESSURE: 66 MMHG | TEMPERATURE: 98.8 F | OXYGEN SATURATION: 98 % | HEART RATE: 82 BPM

## 2017-05-23 VITALS
OXYGEN SATURATION: 88 % | SYSTOLIC BLOOD PRESSURE: 105 MMHG | HEART RATE: 86 BPM | DIASTOLIC BLOOD PRESSURE: 59 MMHG | RESPIRATION RATE: 20 BRPM

## 2017-05-23 VITALS
OXYGEN SATURATION: 93 % | DIASTOLIC BLOOD PRESSURE: 90 MMHG | HEART RATE: 110 BPM | TEMPERATURE: 99 F | RESPIRATION RATE: 34 BRPM | SYSTOLIC BLOOD PRESSURE: 120 MMHG

## 2017-05-23 VITALS
RESPIRATION RATE: 26 BRPM | OXYGEN SATURATION: 98 % | SYSTOLIC BLOOD PRESSURE: 116 MMHG | HEART RATE: 76 BPM | TEMPERATURE: 98.7 F | DIASTOLIC BLOOD PRESSURE: 63 MMHG

## 2017-05-23 VITALS
HEART RATE: 82 BPM | DIASTOLIC BLOOD PRESSURE: 56 MMHG | RESPIRATION RATE: 27 BRPM | OXYGEN SATURATION: 98 % | SYSTOLIC BLOOD PRESSURE: 102 MMHG

## 2017-05-23 VITALS
SYSTOLIC BLOOD PRESSURE: 103 MMHG | OXYGEN SATURATION: 98 % | HEART RATE: 90 BPM | TEMPERATURE: 99.3 F | DIASTOLIC BLOOD PRESSURE: 63 MMHG | RESPIRATION RATE: 22 BRPM

## 2017-05-23 VITALS
OXYGEN SATURATION: 96 % | SYSTOLIC BLOOD PRESSURE: 97 MMHG | RESPIRATION RATE: 27 BRPM | HEART RATE: 106 BPM | DIASTOLIC BLOOD PRESSURE: 54 MMHG

## 2017-05-23 VITALS
DIASTOLIC BLOOD PRESSURE: 72 MMHG | OXYGEN SATURATION: 100 % | SYSTOLIC BLOOD PRESSURE: 139 MMHG | RESPIRATION RATE: 18 BRPM | HEART RATE: 61 BPM

## 2017-05-23 VITALS
OXYGEN SATURATION: 83 % | DIASTOLIC BLOOD PRESSURE: 70 MMHG | RESPIRATION RATE: 20 BRPM | TEMPERATURE: 97.5 F | SYSTOLIC BLOOD PRESSURE: 115 MMHG | HEART RATE: 64 BPM

## 2017-05-23 VITALS
RESPIRATION RATE: 17 BRPM | DIASTOLIC BLOOD PRESSURE: 64 MMHG | HEART RATE: 62 BPM | SYSTOLIC BLOOD PRESSURE: 109 MMHG | OXYGEN SATURATION: 98 %

## 2017-05-23 VITALS
HEART RATE: 86 BPM | RESPIRATION RATE: 20 BRPM | OXYGEN SATURATION: 88 % | DIASTOLIC BLOOD PRESSURE: 59 MMHG | SYSTOLIC BLOOD PRESSURE: 105 MMHG

## 2017-05-23 VITALS
OXYGEN SATURATION: 98 % | SYSTOLIC BLOOD PRESSURE: 119 MMHG | HEART RATE: 60 BPM | RESPIRATION RATE: 16 BRPM | DIASTOLIC BLOOD PRESSURE: 65 MMHG

## 2017-05-23 VITALS
RESPIRATION RATE: 28 BRPM | OXYGEN SATURATION: 94 % | HEART RATE: 116 BPM | SYSTOLIC BLOOD PRESSURE: 94 MMHG | DIASTOLIC BLOOD PRESSURE: 52 MMHG

## 2017-05-23 VITALS
SYSTOLIC BLOOD PRESSURE: 105 MMHG | HEART RATE: 76 BPM | DIASTOLIC BLOOD PRESSURE: 64 MMHG | OXYGEN SATURATION: 99 % | RESPIRATION RATE: 24 BRPM

## 2017-05-23 VITALS — HEART RATE: 82 BPM | OXYGEN SATURATION: 98 % | RESPIRATION RATE: 24 BRPM

## 2017-05-23 VITALS — OXYGEN SATURATION: 96 %

## 2017-05-23 VITALS
SYSTOLIC BLOOD PRESSURE: 116 MMHG | RESPIRATION RATE: 25 BRPM | DIASTOLIC BLOOD PRESSURE: 65 MMHG | OXYGEN SATURATION: 99 % | HEART RATE: 78 BPM

## 2017-05-23 VITALS — OXYGEN SATURATION: 97 %

## 2017-05-23 LAB
ALP SERPL-CCNC: 148 U/L (ref 45–117)
ALT SERPL-CCNC: 27 U/L (ref 12–78)
ANION GAP SERPL CALC-SCNC: 10 MEQ/L (ref 5–15)
ANION GAP SERPL CALC-SCNC: 12 MEQ/L (ref 5–15)
AST SERPL-CCNC: 32 U/L (ref 15–37)
BASOPHILS # BLD AUTO: 0 TH/MM3 (ref 0–0.2)
BASOPHILS NFR BLD: 0.1 % (ref 0–2)
BILIRUB SERPL-MCNC: 0.4 MG/DL (ref 0.2–1)
BUN SERPL-MCNC: 31 MG/DL (ref 7–18)
BUN SERPL-MCNC: 33 MG/DL (ref 7–18)
CHLORIDE SERPL-SCNC: 105 MEQ/L (ref 98–107)
CHLORIDE SERPL-SCNC: 105 MEQ/L (ref 98–107)
EOSINOPHIL # BLD: 0.3 TH/MM3 (ref 0–0.4)
EOSINOPHIL NFR BLD: 3 % (ref 0–4)
ERYTHROCYTE [DISTWIDTH] IN BLOOD BY AUTOMATED COUNT: 17.8 % (ref 11.6–17.2)
GFR SERPLBLD BASED ON 1.73 SQ M-ARVRAT: 51 ML/MIN (ref 89–?)
GFR SERPLBLD BASED ON 1.73 SQ M-ARVRAT: 55 ML/MIN (ref 89–?)
HCO3 BLD-SCNC: 23.3 MEQ/L (ref 21–32)
HCO3 BLD-SCNC: 24.3 MEQ/L (ref 21–32)
HCT VFR BLD CALC: 29.8 % (ref 39–51)
HEMO FLAGS: (no result)
LYMPHOCYTES # BLD AUTO: 0.3 TH/MM3 (ref 1–4.8)
LYMPHOCYTES NFR BLD AUTO: 3.4 % (ref 9–44)
MCH RBC QN AUTO: 26.9 PG (ref 27–34)
MCHC RBC AUTO-ENTMCNC: 32.5 % (ref 32–36)
MCV RBC AUTO: 82.8 FL (ref 80–100)
MONOCYTES NFR BLD: 0.2 % (ref 0–8)
NEUTROPHILS # BLD AUTO: 8.7 TH/MM3 (ref 1.8–7.7)
NEUTROPHILS NFR BLD AUTO: 93.3 % (ref 16–70)
PLATELET # BLD: 152 TH/MM3 (ref 150–450)
POTASSIUM SERPL-SCNC: 3.3 MEQ/L (ref 3.5–5.1)
POTASSIUM SERPL-SCNC: 3.3 MEQ/L (ref 3.5–5.1)
RBC # BLD AUTO: 3.6 MIL/MM3 (ref 4.5–5.9)
SCAN/DIFF: (no result)
SODIUM SERPL-SCNC: 139 MEQ/L (ref 136–145)
SODIUM SERPL-SCNC: 140 MEQ/L (ref 136–145)
WBC # BLD AUTO: 9.3 TH/MM3 (ref 4–11)

## 2017-05-23 PROCEDURE — 0T9030Z DRAINAGE OF RIGHT KIDNEY WITH DRAINAGE DEVICE, PERCUTANEOUS APPROACH: ICD-10-PCS | Performed by: RADIOLOGY

## 2017-05-23 RX ADMIN — IPRATROPIUM BROMIDE AND ALBUTEROL SULFATE SCH AMPULE: .5; 3 SOLUTION RESPIRATORY (INHALATION) at 13:42

## 2017-05-23 RX ADMIN — CHLORHEXIDINE GLUCONATE SCH PACK: 500 CLOTH TOPICAL at 03:59

## 2017-05-23 RX ADMIN — POTASSIUM CHLORIDE PRN MLS/HR: 200 INJECTION, SOLUTION INTRAVENOUS at 08:14

## 2017-05-23 RX ADMIN — PHENYTOIN SODIUM SCH MLS/HR: 50 INJECTION INTRAMUSCULAR; INTRAVENOUS at 14:06

## 2017-05-23 RX ADMIN — INSULIN ASPART SCH: 100 INJECTION, SOLUTION INTRAVENOUS; SUBCUTANEOUS at 09:06

## 2017-05-23 RX ADMIN — Medication SCH ML: at 08:15

## 2017-05-23 RX ADMIN — PROMETHAZINE HYDROCHLORIDE PRN MG: 25 TABLET ORAL at 18:27

## 2017-05-23 RX ADMIN — PHENYTOIN SODIUM SCH MLS/HR: 50 INJECTION INTRAMUSCULAR; INTRAVENOUS at 19:54

## 2017-05-23 RX ADMIN — INSULIN ASPART SCH: 100 INJECTION, SOLUTION INTRAVENOUS; SUBCUTANEOUS at 00:00

## 2017-05-23 RX ADMIN — ASPIRIN SCH MG: 81 TABLET ORAL at 08:14

## 2017-05-23 RX ADMIN — PHENYTOIN SODIUM SCH MLS/HR: 50 INJECTION INTRAMUSCULAR; INTRAVENOUS at 06:06

## 2017-05-23 RX ADMIN — ENOXAPARIN SODIUM SCH MG: 40 INJECTION SUBCUTANEOUS at 08:14

## 2017-05-23 RX ADMIN — BUDESONIDE AND FORMOTEROL FUMARATE DIHYDRATE SCH PUFF: 160; 4.5 AEROSOL RESPIRATORY (INHALATION) at 19:56

## 2017-05-23 RX ADMIN — IPRATROPIUM BROMIDE AND ALBUTEROL SULFATE SCH AMPULE: .5; 3 SOLUTION RESPIRATORY (INHALATION) at 20:35

## 2017-05-23 RX ADMIN — CEFTRIAXONE SODIUM SCH MLS/HR: 2 INJECTION, POWDER, FOR SOLUTION INTRAMUSCULAR; INTRAVENOUS at 18:11

## 2017-05-23 RX ADMIN — PRAVASTATIN SODIUM SCH MG: 40 TABLET ORAL at 19:54

## 2017-05-23 RX ADMIN — PANTOPRAZOLE SODIUM SCH MG: 20 TABLET, DELAYED RELEASE ORAL at 08:14

## 2017-05-23 RX ADMIN — INSULIN ASPART SCH: 100 INJECTION, SOLUTION INTRAVENOUS; SUBCUTANEOUS at 20:04

## 2017-05-23 RX ADMIN — INSULIN ASPART SCH: 100 INJECTION, SOLUTION INTRAVENOUS; SUBCUTANEOUS at 23:36

## 2017-05-23 RX ADMIN — INSULIN ASPART SCH: 100 INJECTION, SOLUTION INTRAVENOUS; SUBCUTANEOUS at 13:32

## 2017-05-23 RX ADMIN — ACETAMINOPHEN PRN MG: 325 TABLET ORAL at 11:39

## 2017-05-23 RX ADMIN — OXYCODONE HYDROCHLORIDE AND ACETAMINOPHEN SCH MG: 500 TABLET ORAL at 20:00

## 2017-05-23 RX ADMIN — SODIUM CHLORIDE SCH MLS/HR: 900 INJECTION INTRAVENOUS at 12:28

## 2017-05-23 RX ADMIN — BUDESONIDE AND FORMOTEROL FUMARATE DIHYDRATE SCH PUFF: 160; 4.5 AEROSOL RESPIRATORY (INHALATION) at 08:15

## 2017-05-23 RX ADMIN — Medication SCH ML: at 20:06

## 2017-05-23 RX ADMIN — TOLTERODINE TARTRATE SCH MG: 4 CAPSULE, EXTENDED RELEASE ORAL at 08:14

## 2017-05-23 RX ADMIN — IPRATROPIUM BROMIDE AND ALBUTEROL SULFATE SCH AMPULE: .5; 3 SOLUTION RESPIRATORY (INHALATION) at 07:22

## 2017-05-23 RX ADMIN — OXYCODONE HYDROCHLORIDE AND ACETAMINOPHEN SCH MG: 500 TABLET ORAL at 08:14

## 2017-05-23 RX ADMIN — INSULIN ASPART SCH: 100 INJECTION, SOLUTION INTRAVENOUS; SUBCUTANEOUS at 18:34

## 2017-05-23 RX ADMIN — INSULIN ASPART SCH: 100 INJECTION, SOLUTION INTRAVENOUS; SUBCUTANEOUS at 03:59

## 2017-05-23 NOTE — RADHPO
EXAM DATE/TIME:  05/23/2017 16:31 

 

[facility] COMPARISON:  

No previous studies available for comparison.

                        

 

INDICATIONS :      

Patient with hydronephrosis in need of nephrostomy tube placement.

                        

 

MEDICAL HISTORY :     

HTN, COPD, Quadriparesis secondary to C-spine injury 2007, Neurogenic bladder, Diabetes, Asthma, HLD

 

SURGICAL HISTORY :     

Cervical spine fusion, Colostomy, Gastric bypass, IVC filter placement

 

ENCOUNTER:     

Initial

 

ACUITY:     

4 - 6 days

 

PAIN SCORE:     

0/10

                        

                        

 

FLUORO TIME:     

7.2 minutes

 

IMAGE SERIES:      

3

 

SEDATION TIME:     

45 minutes

 

CONTRAST:     

20 cc Omnipaque (iohexol) 350 

 

MEDICATION(S):      

1.)  1 mg lorazepam (Ativan)  IV     

2.)  150 mcg fentanyl (Sublimaze)  IV     

      

      

 

DEVICE(S):      

1.)  8 [place]ib[place] X25CM nephrostomy catheter      

 

 

PROCEDURE :     

1.  Ultrasound-guided puncture of the kidney.

2.  Antegrade percutaneous pyelogram.

3.  Percutaneous nephrostomy placement.

4.  Conscious sedation with continuous EKG and oximetry monitoring.

 

PROCEDURE :     

1.  fluoroscopic guided puncture of the kidney.

2.  Antegrade percutaneous pyelogram.

3.  Percutaneous nephrostomy placement.

4.  Conscious sedation with continuous EKG and oximetry monitoring.

 

TECHNIQUE:     

The patient was placed prone on the fluoroscopy table. The back was prepped in sterile fashion. Full 
sterile technique was used, including cap, mask, sterile gloves and gown and a large sterile sheet. H
and hygiene and 2% chlorhexidine and/or betadine/alcohol prep was utilized per protocol for cutaneous
 antisepsis. The skin and subcutaneous tissues were infiltrated with local anesthetic solution.

 

Under direct ultrasound and fluoroscopic guidance, a 22 gauge Chiba needle was used to access a poste
rior lower pole calyx of the right kidney. A 0.018 inch guidewire was introduced and manipulated into
 the proximal ureter. The AccuStick dilator was used to aid in insertion of a 4 [place]ian vessel dilator
. The transparenchymal tract was tested over a Maki-Jaden adapter revealing direct access into the 
collecting system with no vascular opacification noted. The AccuStick was then used to insert a 5.5 F
rench vessel dilator through which an angled Glidewire was inserted. An 8 Djiboutian pigtail nephrostomy 
tube was then introduced and formed up in the renal pelvis. This was secured at the skin with silk reese
ture and connected to gravity drainage. Small volume contrast injection confirmed good positioning in
 the collecting system.

 

Conscious sedation was performed with the prescribed dosages and duration as above in the presence of
 an independent trained radiology nurse to assist in the monitoring of the patient.  EKG and oximetry
 remained stable throughout the procedure.  The patient tolerated the procedure well and there were n
o complications.  The patient was sent to post anesthesia recovery in stable condition.

 

CONCLUSION:     Uncomplicated nephrostomy tube placement as above.

 

 

 

 Mike Watts MD on May 23, 2017 at 17:59           

Board Certified Radiologist.

 This report was verified electronically.

## 2017-05-23 NOTE — HHI.IDPN
Subjective


Subjective


Remarks


chart reviewed


67 M with h/o traumatic SCI resulted in incomplete quadriplegia with neurogenic 

bladder chronic hughes and renal stones presented with E.coli sepsis 2/2 

complicated UTI


CT with obstructing stone and moderate R sided hydronephrosis


sp R nephrostomy placement 


off pressors since this am


adequate UOP


Antibiotics


CFTX


Past Medical History


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


Neurogenic bladder


Type 2 diabetes


Hypertension


Dyslipidemia


Obstructive sleep apnea


Asthma COPD


Kidney stones


Past Surgical History


Cervical spine fusion in 2007


Colostomy in 2007


Allergies:  


Coded Allergies:  


     Morphine (Verified  Allergy, Severe, 5/20/17)


 HALLUCINATIONS


     Sulfa (Verified  Allergy, Severe, 5/20/17)


 SKIN RASH


     *MDRO Multi-Drug Resistant Organism (Verified  Allergy, Unknown, 5/22/17)


 MRSA PCR Positive 5/20/17


 MRSA 2008


 Enterococcus faecium VRE 2007


 Carbapenem-resistant Acinetobacter baumannii 2007





Objective


.





 Vital Signs








  Date Time  Temp Pulse Resp B/P Pulse Ox O2 Delivery O2 Flow Rate FiO2


 


5/23/17 20:00  64 18 113/66 100   


 


5/23/17 20:00  74      


 


5/23/17 19:00 97.5 64 20 115/70 83   


 


5/23/17 18:00  80      


 


5/23/17 18:00  80 23 107/65 98   


 


5/23/17 17:32  86 20 105/59 88   


 


5/23/17 17:32  86 20 105/59 88   


 


5/23/17 17:00  86 20 105/59 88   


 


5/23/17 16:00  84      


 


5/23/17 16:00  84 23  97   


 


5/23/17 15:00 99.3 90 22 103/63 98   


 


5/23/17 14:00  82      


 


5/23/17 14:00  82 24  98   


 


5/23/17 13:31  92 21 91/57 97   


 


5/23/17 13:31  92      


 


5/23/17 12:39   22     


 


5/23/17 12:00  104      


 


5/23/17 12:00 100.5       


 


5/23/17 12:00  104 27  95   


 


5/23/17 11:00 101.0       


 


5/23/17 11:00  110 28 115/64 97   


 


5/23/17 11:00  110      


 


5/23/17 10:00  108      


 


5/23/17 10:00  108 24  96   


 


5/23/17 09:25  106 27 97/54 96   


 


5/23/17 09:00  106 27 109/55 95   


 


5/23/17 08:00 100.7 112 30 98/51 94   


 


5/23/17 07:22     97 Nasal Cannula 3.00 


 


5/23/17 07:00  116 28 94/52 94   


 


5/23/17 06:00 99.0 110 34 120/90 93   


 


5/23/17 06:00  110      


 


5/23/17 05:00  76 24 105/64 99   


 


5/23/17 04:00 98.7 76 26 116/63 98   


 


5/23/17 04:00  76      


 


5/23/17 03:00  72 24 110/66 99   


 


5/23/17 02:00  78      


 


5/23/17 02:00  78 25 116/65 99   


 


5/23/17 01:00  82 27 102/56 98   


 


5/23/17 00:00 98.8 82 22 123/66 98   


 


5/23/17 00:00  82      


 


5/22/17 23:00  82 29 132/70 97   


 


5/22/17 22:00  98      


 


5/22/17 22:00  98 25 103/56 97   














 5/22/17 5/22/17 5/23/17





 14:59 22:59 06:59


 


Intake Total 1690 ml 2950 ml 1025 ml


 


Output Total 660 ml 400 ml 650 ml


 


Balance 1030 ml 2550 ml 375 ml


 


   


 


Intake Oral 180 ml 500 ml 


 


IV Total 1510 ml 2450 ml 1025 ml


 


Output Urine Total 660 ml 400 ml 600 ml


 


Stool Total   50 ml








.





Laboratory Tests








Test 5/22/17 5/22/17 5/23/17





 04:30 09:30 07:50


 


White Blood Count 16.6 TH/MM3 13.7 TH/MM3 9.3 TH/MM3


 


Red Blood Count 3.50 MIL/MM3 3.67 MIL/MM3 3.60 MIL/MM3


 


Hemoglobin 9.4 GM/DL 9.5 GM/DL 9.7 GM/DL


 


Hematocrit 28.9 % 29.7 % 29.8 %


 


Mean Corpuscular Volume 82.5 FL 80.9 FL 82.8 FL


 


Mean Corpuscular Hemoglobin 26.9 PG 26.0 PG 26.9 PG


 


Mean Corpuscular Hemoglobin 32.6 % 32.1 % 32.5 %





Concent   


 


Red Cell Distribution Width 17.5 % 16.8 % 17.8 %


 


Platelet Count 168 TH/MM3 169 TH/MM3 152 TH/MM3


 


Mean Platelet Volume 8.7 FL 8.6 FL 8.7 FL


 


Neutrophils (%) (Auto)  97.0 % 93.3 %


 


Lymphocytes (%) (Auto)  2.1 % 3.4 %


 


Monocytes (%) (Auto)  0.7 % 0.2 %


 


Eosinophils (%) (Auto)  0.2 % 3.0 %


 


Basophils (%) (Auto)  0.0 % 0.1 %


 


Neutrophils # (Auto)  13.3 TH/MM3 8.7 TH/MM3


 


Lymphocytes # (Auto)  0.3 TH/MM3 0.3 TH/MM3


 


Monocytes # (Auto)  0.1 TH/MM3 0.0 TH/MM3


 


Eosinophils # (Auto)  0.0 TH/MM3 0.3 TH/MM3


 


Basophils # (Auto)  0.0 TH/MM3 0.0 TH/MM3


 


CBC Comment  DIFF FINAL  AUTO DIFF 


 


Differential Comment    AUTO DIFF





   CONFIRMED








Laboratory Tests








Test 5/22/17 5/23/17 5/23/17 5/23/17





 04:30 07:50 16:30 18:00


 


Sodium Level 136 MEQ/L 140 MEQ/L  139 MEQ/L


 


Potassium Level 3.3 MEQ/L 3.3 MEQ/L  3.3 MEQ/L


 


Chloride Level 102 MEQ/L 105 MEQ/L  105 MEQ/L


 


Carbon Dioxide Level 23.4 MEQ/L 23.3 MEQ/L  24.3 MEQ/L


 


Anion Gap 11 MEQ/L 12 MEQ/L  10 MEQ/L


 


Blood Urea Nitrogen 28 MG/DL 31 MG/DL  33 MG/DL


 


Creatinine 1.70 MG/DL 1.40 MG/DL  1.30 MG/DL


 


Estimat Glomerular Filtration 41 ML/MIN 51 ML/MIN  55 ML/MIN





Rate    


 


Random Glucose 247 MG/ MG/DL  237 MG/DL


 


Lactic Acid Level 2.7 mmol/L  1.7 mmol/L 


 


Calcium Level 7.9 MG/DL 7.9 MG/DL  7.9 MG/DL


 


Phosphorus Level 1.2 MG/DL   


 


Magnesium Level 1.7 MG/DL   


 


Total Bilirubin 0.5 MG/DL 0.4 MG/DL  


 


Aspartate Amino Transf 25 U/L 32 U/L  





(AST/SGOT)    


 


Alanine Aminotransferase 22 U/L 27 U/L  





(ALT/SGPT)    


 


Alkaline Phosphatase 65 U/L 148 U/L  


 


Total Creatine Kinase 212 U/L   


 


Total Protein 6.1 GM/DL 6.2 GM/DL  


 


Albumin 2.2 GM/DL 2.1 GM/DL  








Microbiology








 Date/Time Procedure Status





Source Growth 


 


 5/22/17 09:30 Aerobic Blood Culture - Preliminary Resulted





Blood Other NO GROWTH IN 1 DAY 





 5/22/17 09:30 Anaerobic Blood Culture - Preliminary Resulted





Blood Other NO GROWTH IN 1 DAY 


 


 5/22/17 10:30 Aerobic Blood Culture - Preliminary Resulted





Blood Other NO GROWTH IN 1 DAY 





 5/22/17 10:30 Anaerobic Blood Culture - Preliminary Resulted





Blood Other NO GROWTH IN 1 DAY 








Imaging





Last Impressions








Chest X-Ray 5/23/17 0600 Signed





Impressions: 





 Service Date/Time:  Tuesday, May 23, 2017 06:30 - CONCLUSION: Slight CHF.    

K. 





 Jose Cochran MD 


 


Nephrostomy 5/23/17 0000 Signed





Impressions: 





 Service Date/Time:  Tuesday, May 23, 2017 16:31 - CONCLUSION: Uncomplicated 





 nephrostomy tube placement as above.     Mike Watts MD 


 


Abdomen/Pelvis CT 5/23/17 0000 Signed





Impressions: 





 Service Date/Time:  Tuesday, May 23, 2017 10:26 - CONCLUSION:  1. There are 





 innumerable nonobstructing stones in the right renal collecting system 

measuring 





 up to 15 x 11 mm. At the right UPJ there is a 15 x 8 mm stone causing moderate 





 right hydronephrosis. 2. There are 5 nonobstructing stones in left kidney 





 measuring up to 8 mm. There is no left hydronephrosis. 3. Multiple urinary 





 bladder stones are present. 4. Small volume of free fluid is present in the 





 right paracolic gutter region. 5. Nonacute findings include atelectasis versus 





 consolidation the lung bases, severe atherosclerotic disease, and fat-

containing 





 right abdominal wall hernia.     Mike Wade MD 


 


Renal Ultrasound 5/22/17 0000 Signed





Impressions: 





 Service Date/Time:  Monday, May 22, 2017 08:28 - CONCLUSION:  Several left 





 kidney stones and mild hydronephrosis. Recommend stone protocol CT for further 





 evaluation.     Mike Watts MD 








Physical Exam


GENERAL: This is a chronically ill, patient with quadriparesis, in no apparent 

distress.


resting comfortably





SKIN: No rashes, ecchymoses or lesions. Cool and dry.


NECK: Trachea midline.


CARDIOVASCULAR: Regular rate and rhythm without murmurs, gallops, or rubs. 


RESPIRATORY: On BIPAP


Clear to auscultation. Breath sounds equal bilaterally. No wheezes, rales, or 

rhonchi.  


GASTROINTESTINAL: Abdomen soft, Obese non-tender, nondistended.


MUSCULOSKELETAL: Extremities without  cyanosis, 


: hughes in place with blood tinged urine


R nephrostomy in place with sm,alll amount of blood


NEUROLOGICAL: Asleep; when awake, with stable nerological deficits





Assessment & Plan


Remarks


 Severe sepsis


   - clincially improved 


UTI, E.coli, Proteus with secondary bacteremia  2/2 E.coli


   -complicated UTI , in the settings of R sided  obstructive uropahty 2/2 

kidney stone


   - sp R nephrostomy


   - both E.coli and Proteu are S to levaquine


Chronic hughes 2/2 neurogenic bladder


Imcomplete quadriplegia





Plan: cont  IV Ceftriaxone 2 g daily


   - anticipate transition to Levaquine IV/PO if remains clincally stable and 

sepsis physiology


   - anticiapate 2 weeks of total abx course





ARF - resolving


Leukocytosis - resolved


Lactic acidosis - resolved





dw Alpa Cohen MD May 23, 2017 21:40

## 2017-05-23 NOTE — RADHPO
EXAM DATE/TIME:  05/23/2017 06:30 

 

[facility] COMPARISON:     

CHEST SINGLE AP, May 21, 2017, 15:39.

 

                     

INDICATIONS :     

Respiratory disease, shortness of breath.

                     

 

MEDICAL HISTORY :     

Sepsis.  Renal insufficiency.        

 

SURGICAL HISTORY :        

Unobtainable.

 

ENCOUNTER:     

Subsequent                                        

 

ACUITY:     

3 days      

 

PAIN SCORE:     

0/10

 

LOCATION:     

Bilateral chest 

 

FINDINGS:     

There is slight cardiomegaly and perivascular pulmonary edema. Focal consolidation is not seen. Left 
IJ line is present with tip overlapping the expected region of the SVC.

 

CONCLUSION:     Slight CHF.

 

 

 ARGELIA Cochran MD on May 23, 2017 at 6:39           

Board Certified Radiologist.

 This report was verified electronically.

## 2017-05-23 NOTE — PD.RAD
Post Procedure Progress Note


Pre Procedure Diagnosis:  


(1) Sepsis


(2) Obstructive uropathy


Post Procedure Diagnosis:  


(1) Sepsis


(2) Obstructive uropathy


Procedure Date:


May 23, 2017


Supervising Radiologist:


Mike Watts


Proceduralist/Assist:  RT Jennifer(R)(CV)


Anesthesia:  Local, Conscious Sedation


Plan of Activity


Patient to Unit:  Critical Care


Patient Condition:  Fair


See PACS Report for procedural detail/treatment





Drainage Procedure


Procedure 1


Imaging Guidance:  Fluoroscopy


Side:  Right


Procedure Type:  Nephrostomy


Procedure:  Placement


Ukrainian:  8


Drainage:  Gravity drainage


Fluid Description:  Purulent








Mike Watts MD May 23, 2017 17:43

## 2017-05-23 NOTE — RADHPO
EXAM DATE/TIME:  05/23/2017 10:26 

 

[facility] COMPARISON:     

US KIDNEY/RENAL/BLADDER, May 22, 2017, 8:28.

 

 

INDICATIONS :     

Severe sepsis. Urinary tract infection. Renal insufficiency. History of renal calculi.

                 

 

ORAL CONTRAST:      

No oral contrast ingested.

                 

 

RADIATION DOSE:     

28.18 CTDIvol (mGy) 

 

 

MEDICAL HISTORY :     

Chronic obstructive pulmonary disease. Gastroesophageal reflux disease. Hypertension.Diabetes. Quadra
palegic.

 

SURGICAL HISTORY :      

Fusion, cervical. Colostomy.Gastric bypass. Bladder self catheterization.

 

ENCOUNTER:      

Initial

 

ACUITY:      

3 days

 

PAIN SCALE:      

0/10

 

LOCATION:         

abdomen/pelvis

 

TECHNIQUE:     

Volumetric scanning of the abdomen and pelvis was performed.  Using automated exposure control and ad
justment of the mA and/or kV according to patient size, radiation dose was kept as low as reasonably 
achievable to obtain optimal diagnostic quality images. 

 

FINDINGS:     

 

LOWER LUNGS:     

There is atelectasis versus consolidation in both lung bases, right greater than left. Trace left ple
ural fluid is present. There is coronary artery calcification.

 

LIVER:     

Homogeneous density without lesion.  There is no dilation of the biliary tree.  Gallbladder is absent
 with clips in the gallbladder fossa.

 

SPLEEN:     

Normal size without lesion.

 

PANCREAS:     

Within normal limits. 

 

KIDNEYS:     

There are innumerable stones in the right renal collecting system numbering greater than 10 in total.
 The largest conglomeration is at the lower pole measuring approximately 15 x 11 mm in total. There a
re at least 6 stones in the right extrarenal pelvis and there is a stone measuring 15 x 8 mm at the r
ight ureter pelvic junction causing moderate right hydronephrosis. There are at least 5 nonobstructin
g stones in the left kidney measuring up to 8 x 4 mm. There is mild distention of the left collecting
 system without significant hydronephrosis. The left upper pole there is a 2.4 cm low-density lesion 
with density measurements characteristic of a cyst.

 

ADRENAL GLANDS:     

Within normal limits.

 

VASCULAR:     

There is no aortic aneurysm. There is severe atherosclerotic disease.

 

BOWEL/MESENTERY:     

The stomach, small bowel, and colon demonstrate no acute abnormality. A right lower quadrant ostomy i
s present. Staple line is present along the greater curvature of a narrowed stomach likely related to
 prior gastric sleep procedure. Distal colon is decompressed. There is a small amount of free fluid i
n the right paracolic gutter. There is no free intraperitoneal air. 

 

ABDOMINAL WALL:     

Right lower quadrant ostomy is present. There is a fat-containing peristomal hernia on the right.

 

RETROPERITONEUM:     

There is no lymphadenopathy.

 

BLADDER:     

Lira catheter is present within the urinary bladder which demonstrates mildly thickened wall given t
he degree of distention. There are innumerable small stones in the bladder measuring up to 8 mm. I es
timate at least 8 stones in the bladder.

 

REPRODUCTIVE:     

Within normal limits.

 

INGUINAL:     

There is no lymphadenopathy or hernia.

 

MUSCULOSKELETAL:     

There are degenerative changes of the lumbar spine with likely areas of spinal canal stenosis inferio
rly. There is heterotopic ossification around the left hip joint.

 

CONCLUSION:     

1. There are innumerable nonobstructing stones in the right renal collecting system measuring up to 1
5 x 11 mm. At the right UPJ there is a 15 x 8 mm stone causing moderate right hydronephrosis.

2. There are 5 nonobstructing stones in left kidney measuring up to 8 mm. There is no left hydronephr
osis.

3. Multiple urinary bladder stones are present.

4. Small volume of free fluid is present in the right paracolic gutter region.

5. Nonacute findings include atelectasis versus consolidation the lung bases, severe atherosclerotic 
disease, and fat-containing right abdominal wall hernia.

 

 

 

 Mike Wade MD on May 23, 2017 at 11:20           

Board Certified Radiologist.

 This report was verified electronically.

## 2017-05-24 VITALS
RESPIRATION RATE: 17 BRPM | OXYGEN SATURATION: 99 % | DIASTOLIC BLOOD PRESSURE: 64 MMHG | HEART RATE: 72 BPM | SYSTOLIC BLOOD PRESSURE: 122 MMHG | TEMPERATURE: 99.1 F

## 2017-05-24 VITALS
DIASTOLIC BLOOD PRESSURE: 78 MMHG | HEART RATE: 67 BPM | RESPIRATION RATE: 19 BRPM | SYSTOLIC BLOOD PRESSURE: 140 MMHG | OXYGEN SATURATION: 100 % | TEMPERATURE: 98.3 F

## 2017-05-24 VITALS
SYSTOLIC BLOOD PRESSURE: 112 MMHG | DIASTOLIC BLOOD PRESSURE: 62 MMHG | RESPIRATION RATE: 19 BRPM | OXYGEN SATURATION: 96 % | HEART RATE: 86 BPM

## 2017-05-24 VITALS
OXYGEN SATURATION: 95 % | SYSTOLIC BLOOD PRESSURE: 109 MMHG | RESPIRATION RATE: 26 BRPM | DIASTOLIC BLOOD PRESSURE: 56 MMHG | HEART RATE: 92 BPM

## 2017-05-24 VITALS
SYSTOLIC BLOOD PRESSURE: 104 MMHG | HEART RATE: 88 BPM | TEMPERATURE: 100.2 F | RESPIRATION RATE: 23 BRPM | OXYGEN SATURATION: 100 % | DIASTOLIC BLOOD PRESSURE: 54 MMHG

## 2017-05-24 VITALS
OXYGEN SATURATION: 100 % | SYSTOLIC BLOOD PRESSURE: 118 MMHG | HEART RATE: 75 BPM | RESPIRATION RATE: 23 BRPM | DIASTOLIC BLOOD PRESSURE: 59 MMHG

## 2017-05-24 VITALS
OXYGEN SATURATION: 97 % | RESPIRATION RATE: 17 BRPM | SYSTOLIC BLOOD PRESSURE: 121 MMHG | DIASTOLIC BLOOD PRESSURE: 67 MMHG | HEART RATE: 74 BPM

## 2017-05-24 VITALS
DIASTOLIC BLOOD PRESSURE: 56 MMHG | RESPIRATION RATE: 24 BRPM | SYSTOLIC BLOOD PRESSURE: 103 MMHG | OXYGEN SATURATION: 98 % | HEART RATE: 88 BPM

## 2017-05-24 VITALS
OXYGEN SATURATION: 97 % | RESPIRATION RATE: 22 BRPM | HEART RATE: 80 BPM | DIASTOLIC BLOOD PRESSURE: 64 MMHG | SYSTOLIC BLOOD PRESSURE: 111 MMHG

## 2017-05-24 VITALS
RESPIRATION RATE: 17 BRPM | SYSTOLIC BLOOD PRESSURE: 126 MMHG | OXYGEN SATURATION: 100 % | DIASTOLIC BLOOD PRESSURE: 67 MMHG | HEART RATE: 80 BPM

## 2017-05-24 VITALS
HEART RATE: 90 BPM | SYSTOLIC BLOOD PRESSURE: 115 MMHG | OXYGEN SATURATION: 97 % | RESPIRATION RATE: 20 BRPM | DIASTOLIC BLOOD PRESSURE: 64 MMHG

## 2017-05-24 VITALS
DIASTOLIC BLOOD PRESSURE: 66 MMHG | HEART RATE: 80 BPM | OXYGEN SATURATION: 99 % | SYSTOLIC BLOOD PRESSURE: 122 MMHG | RESPIRATION RATE: 19 BRPM

## 2017-05-24 VITALS
SYSTOLIC BLOOD PRESSURE: 100 MMHG | OXYGEN SATURATION: 95 % | TEMPERATURE: 99 F | RESPIRATION RATE: 20 BRPM | HEART RATE: 86 BPM | DIASTOLIC BLOOD PRESSURE: 65 MMHG

## 2017-05-24 VITALS
OXYGEN SATURATION: 94 % | SYSTOLIC BLOOD PRESSURE: 100 MMHG | HEART RATE: 78 BPM | DIASTOLIC BLOOD PRESSURE: 65 MMHG | RESPIRATION RATE: 24 BRPM

## 2017-05-24 VITALS
DIASTOLIC BLOOD PRESSURE: 63 MMHG | OXYGEN SATURATION: 97 % | HEART RATE: 71 BPM | RESPIRATION RATE: 20 BRPM | SYSTOLIC BLOOD PRESSURE: 113 MMHG

## 2017-05-24 VITALS
RESPIRATION RATE: 22 BRPM | HEART RATE: 86 BPM | DIASTOLIC BLOOD PRESSURE: 61 MMHG | OXYGEN SATURATION: 95 % | SYSTOLIC BLOOD PRESSURE: 119 MMHG

## 2017-05-24 VITALS
RESPIRATION RATE: 14 BRPM | SYSTOLIC BLOOD PRESSURE: 141 MMHG | OXYGEN SATURATION: 100 % | HEART RATE: 72 BPM | DIASTOLIC BLOOD PRESSURE: 75 MMHG

## 2017-05-24 VITALS
RESPIRATION RATE: 20 BRPM | OXYGEN SATURATION: 98 % | DIASTOLIC BLOOD PRESSURE: 65 MMHG | SYSTOLIC BLOOD PRESSURE: 134 MMHG | TEMPERATURE: 98.9 F | HEART RATE: 74 BPM

## 2017-05-24 VITALS — OXYGEN SATURATION: 95 %

## 2017-05-24 VITALS
RESPIRATION RATE: 25 BRPM | SYSTOLIC BLOOD PRESSURE: 100 MMHG | DIASTOLIC BLOOD PRESSURE: 50 MMHG | HEART RATE: 92 BPM | OXYGEN SATURATION: 96 %

## 2017-05-24 VITALS
HEART RATE: 74 BPM | DIASTOLIC BLOOD PRESSURE: 65 MMHG | OXYGEN SATURATION: 97 % | RESPIRATION RATE: 22 BRPM | SYSTOLIC BLOOD PRESSURE: 112 MMHG | TEMPERATURE: 99.2 F

## 2017-05-24 VITALS
OXYGEN SATURATION: 100 % | SYSTOLIC BLOOD PRESSURE: 115 MMHG | HEART RATE: 72 BPM | DIASTOLIC BLOOD PRESSURE: 62 MMHG | RESPIRATION RATE: 18 BRPM

## 2017-05-24 VITALS
RESPIRATION RATE: 34 BRPM | OXYGEN SATURATION: 95 % | HEART RATE: 82 BPM | DIASTOLIC BLOOD PRESSURE: 61 MMHG | SYSTOLIC BLOOD PRESSURE: 108 MMHG

## 2017-05-24 VITALS
SYSTOLIC BLOOD PRESSURE: 125 MMHG | TEMPERATURE: 99.5 F | OXYGEN SATURATION: 95 % | HEART RATE: 88 BPM | DIASTOLIC BLOOD PRESSURE: 65 MMHG | RESPIRATION RATE: 23 BRPM

## 2017-05-24 VITALS
SYSTOLIC BLOOD PRESSURE: 101 MMHG | RESPIRATION RATE: 23 BRPM | HEART RATE: 80 BPM | OXYGEN SATURATION: 99 % | DIASTOLIC BLOOD PRESSURE: 58 MMHG

## 2017-05-24 VITALS — HEART RATE: 82 BPM

## 2017-05-24 VITALS — HEART RATE: 88 BPM | OXYGEN SATURATION: 98 % | RESPIRATION RATE: 22 BRPM

## 2017-05-24 VITALS — HEART RATE: 90 BPM

## 2017-05-24 LAB
ALP SERPL-CCNC: 73 U/L (ref 45–117)
ALT SERPL-CCNC: 24 U/L (ref 12–78)
ANION GAP SERPL CALC-SCNC: 9 MEQ/L (ref 5–15)
AST SERPL-CCNC: 25 U/L (ref 15–37)
BASOPHILS # BLD AUTO: 0.1 TH/MM3 (ref 0–0.2)
BASOPHILS NFR BLD: 0.3 % (ref 0–2)
BILIRUB SERPL-MCNC: 0.2 MG/DL (ref 0.2–1)
BUN SERPL-MCNC: 29 MG/DL (ref 7–18)
CHLORIDE SERPL-SCNC: 105 MEQ/L (ref 98–107)
EOSINOPHIL # BLD: 0.1 TH/MM3 (ref 0–0.4)
EOSINOPHIL NFR BLD: 0.5 % (ref 0–4)
ERYTHROCYTE [DISTWIDTH] IN BLOOD BY AUTOMATED COUNT: 17.8 % (ref 11.6–17.2)
GFR SERPLBLD BASED ON 1.73 SQ M-ARVRAT: 61 ML/MIN (ref 89–?)
HCO3 BLD-SCNC: 26.1 MEQ/L (ref 21–32)
HCT VFR BLD CALC: 32 % (ref 39–51)
HEMO FLAGS: (no result)
LYMPHOCYTES # BLD AUTO: 0.8 TH/MM3 (ref 1–4.8)
LYMPHOCYTES NFR BLD AUTO: 4.4 % (ref 9–44)
MAGNESIUM SERPL-MCNC: 1.7 MG/DL (ref 1.5–2.5)
MCH RBC QN AUTO: 27.6 PG (ref 27–34)
MCHC RBC AUTO-ENTMCNC: 33.8 % (ref 32–36)
MCV RBC AUTO: 81.7 FL (ref 80–100)
MONOCYTES NFR BLD: 1.2 % (ref 0–8)
NEUTROPHILS # BLD AUTO: 17.8 TH/MM3 (ref 1.8–7.7)
NEUTROPHILS NFR BLD AUTO: 93.6 % (ref 16–70)
PLAT MORPH BLD: NORMAL
PLATELET # BLD: 147 TH/MM3 (ref 150–450)
PLATELET BLD QL SMEAR: (no result)
POTASSIUM SERPL-SCNC: 3.1 MEQ/L (ref 3.5–5.1)
POTASSIUM SERPL-SCNC: 3.4 MEQ/L (ref 3.5–5.1)
RBC # BLD AUTO: 3.92 MIL/MM3 (ref 4.5–5.9)
RBC MORPH BLD: NORMAL
SCAN/DIFF: (no result)
SODIUM SERPL-SCNC: 140 MEQ/L (ref 136–145)
WBC # BLD AUTO: 19 TH/MM3 (ref 4–11)

## 2017-05-24 RX ADMIN — PANTOPRAZOLE SODIUM SCH MG: 20 TABLET, DELAYED RELEASE ORAL at 09:42

## 2017-05-24 RX ADMIN — INSULIN ASPART SCH: 100 INJECTION, SOLUTION INTRAVENOUS; SUBCUTANEOUS at 09:47

## 2017-05-24 RX ADMIN — INSULIN ASPART SCH: 100 INJECTION, SOLUTION INTRAVENOUS; SUBCUTANEOUS at 12:55

## 2017-05-24 RX ADMIN — IPRATROPIUM BROMIDE AND ALBUTEROL SULFATE SCH AMPULE: .5; 3 SOLUTION RESPIRATORY (INHALATION) at 09:32

## 2017-05-24 RX ADMIN — BUDESONIDE AND FORMOTEROL FUMARATE DIHYDRATE SCH PUFF: 160; 4.5 AEROSOL RESPIRATORY (INHALATION) at 09:49

## 2017-05-24 RX ADMIN — SODIUM CHLORIDE SCH MLS/HR: 900 INJECTION INTRAVENOUS at 10:42

## 2017-05-24 RX ADMIN — POTASSIUM CHLORIDE PRN MLS/HR: 400 INJECTION, SOLUTION INTRAVENOUS at 17:02

## 2017-05-24 RX ADMIN — PHENYTOIN SODIUM SCH MLS/HR: 50 INJECTION INTRAMUSCULAR; INTRAVENOUS at 09:42

## 2017-05-24 RX ADMIN — IPRATROPIUM BROMIDE AND ALBUTEROL SULFATE SCH AMPULE: .5; 3 SOLUTION RESPIRATORY (INHALATION) at 15:23

## 2017-05-24 RX ADMIN — BUDESONIDE AND FORMOTEROL FUMARATE DIHYDRATE SCH PUFF: 160; 4.5 AEROSOL RESPIRATORY (INHALATION) at 21:19

## 2017-05-24 RX ADMIN — INSULIN ASPART SCH: 100 INJECTION, SOLUTION INTRAVENOUS; SUBCUTANEOUS at 21:24

## 2017-05-24 RX ADMIN — CHLORHEXIDINE GLUCONATE SCH PACK: 500 CLOTH TOPICAL at 04:00

## 2017-05-24 RX ADMIN — INSULIN ASPART SCH: 100 INJECTION, SOLUTION INTRAVENOUS; SUBCUTANEOUS at 04:17

## 2017-05-24 RX ADMIN — CEFTRIAXONE SODIUM SCH MLS/HR: 2 INJECTION, POWDER, FOR SOLUTION INTRAMUSCULAR; INTRAVENOUS at 17:01

## 2017-05-24 RX ADMIN — IPRATROPIUM BROMIDE AND ALBUTEROL SULFATE SCH AMPULE: .5; 3 SOLUTION RESPIRATORY (INHALATION) at 20:23

## 2017-05-24 RX ADMIN — PRAVASTATIN SODIUM SCH MG: 40 TABLET ORAL at 21:20

## 2017-05-24 RX ADMIN — ASPIRIN SCH MG: 81 TABLET ORAL at 09:42

## 2017-05-24 RX ADMIN — ENOXAPARIN SODIUM SCH MG: 40 INJECTION SUBCUTANEOUS at 09:42

## 2017-05-24 RX ADMIN — OXYCODONE HYDROCHLORIDE AND ACETAMINOPHEN SCH MG: 500 TABLET ORAL at 09:42

## 2017-05-24 RX ADMIN — OXYCODONE HYDROCHLORIDE AND ACETAMINOPHEN SCH MG: 500 TABLET ORAL at 21:20

## 2017-05-24 RX ADMIN — TOLTERODINE TARTRATE SCH MG: 4 CAPSULE, EXTENDED RELEASE ORAL at 09:42

## 2017-05-24 RX ADMIN — INSULIN ASPART SCH: 100 INJECTION, SOLUTION INTRAVENOUS; SUBCUTANEOUS at 17:46

## 2017-05-24 RX ADMIN — Medication SCH ML: at 09:43

## 2017-05-24 RX ADMIN — Medication SCH ML: at 21:24

## 2017-05-24 NOTE — HHI.CCPN
Subjective


Remarks/Hospital Course


Patient is a 67-year-old  male with past medical history of incomplete 

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 

self-catheterization, type 2 diabetes, hypertension, COPD/asthma, obstructive 

sleep apnea and obesity who presented to the [place] emergency department 

with cough congestion, fever chills and weakness.  Patient states the symptoms 

started 5/19/17.  Cough is nonproductive.  Patient also feels that he may be 

having a urinary tract infection as he has seen color change, urine was bloody 

and somewhat cloudy.  In the ER patient had a blood pressure of 93/64, MAXIMUM 

TEMPERATURE was 103.  Lab workup showed that white count was 17.4, lactic acid 

2.4.  BUN was 27 creatinine 1.6 his previous creatinine 2015 was 0.62.  UA 

showed evidence of UTI.  Patient was admitted to critical care service to the 

ICU.  He received 2 L IV fluid boluses and also was placed on vancomycin and 

azithromycin and Zosyn. I evaluated the patient in ICU.  He appears ill but 

improving.  Blood pressure has stabilized.  He is making urine.  Fever trending 

down





5/22: Became hypotensive tachycardic yesterday evening with fever up to 104.  

Central line was placed, Levaquin added and single dose of gentamicin.  

Currently on vasopressin.  Mike fever 101 at 4 AM.  White count increasing 

16.6 today, creatinine stable at 1.7. UO 2L in 24 hour. Bld cx E Coli, urine cx 

GNR.  As the patient is still in septic shock, stopped Zosyn start meropenem.  

ID consult pending.


5/23: Off vasopressin.  Became short of breath this morning.  Chest x-ray 

suggests fluid overload and fluids cut down to 60 cc per hour and given Lasix 

20 mg IV stat.  Urine and blood cultures from 5/20 growing Escherichia coli 

sensitive to Rocephin. 





Subjective





5/24:  Currently on nasal CPAP.  Diuresed yesterday.  Right-sided nephrostomy 

tube placed.  Breathing better this AM.  Tolerating diet.





Objective





 Vital Signs








  Date Time  Temp Pulse Resp B/P Pulse Ox O2 Delivery O2 Flow Rate FiO2


 


5/24/17 03:00  72 18 115/62 100   


 


5/24/17 00:00 98.3       


 


5/23/17 20:35      Nasal Cannula 2.00 


 


5/20/17 20:38        21








 Intake and Output








 5/23/17 5/23/17 5/24/17





 08:00 16:00 00:00


 


Intake Total 1025 ml 1416 ml 1088 ml


 


Output Total 650 ml 1300 ml 1600 ml


 


Balance 375 ml 116 ml -512 ml








Result Diagram:  


5/24/17 0400                                                                   

             5/24/17 0400





Other Results





Microbiology








 Date/Time Procedure Status





Source Growth 


 


 5/22/17 10:30 Aerobic Blood Culture - Preliminary Resulted





Blood Other NO GROWTH IN 1 DAY 





 5/22/17 10:30 Anaerobic Blood Culture - Preliminary Resulted





Blood Other NO GROWTH IN 1 DAY 


 


 5/20/17 19:20 Urine Culture - Final Complete





Urine Catheterized Urine Escherichia Coli 





 Proteus Mirabilis 


 


 5/20/17 18:50 Aerobic Blood Culture - Final Resulted





Blood Peripheral Escherichia Coli 





 5/20/17 18:50 Anaerobic Blood Culture - Preliminary Resulted





Blood Peripheral NO GROWTH IN 3 DAYS 








Imaging





Last Impressions








Chest X-Ray 5/23/17 0600 Signed





Impressions: 





 Service Date/Time:  Tuesday, May 23, 2017 06:30 - CONCLUSION: Slight CHF.    

K. 





 Jose Cochran MD 


 


Nephrostomy 5/23/17 0000 Signed





Impressions: 





 Service Date/Time:  Tuesday, May 23, 2017 16:31 - CONCLUSION: Uncomplicated 





 nephrostomy tube placement as above.     Mike Watts MD 


 


Abdomen/Pelvis CT 5/23/17 0000 Signed





Impressions: 





 Service Date/Time:  Tuesday, May 23, 2017 10:26 - CONCLUSION:  1. There are 





 innumerable nonobstructing stones in the right renal collecting system 

measuring 





 up to 15 x 11 mm. At the right UPJ there is a 15 x 8 mm stone causing moderate 





 right hydronephrosis. 2. There are 5 nonobstructing stones in left kidney 





 measuring up to 8 mm. There is no left hydronephrosis. 3. Multiple urinary 





 bladder stones are present. 4. Small volume of free fluid is present in the 





 right paracolic gutter region. 5. Nonacute findings include atelectasis versus 





 consolidation the lung bases, severe atherosclerotic disease, and fat-

containing 





 right abdominal wall hernia.     Mike Wade MD 


 


Renal Ultrasound 5/22/17 0000 Signed





Impressions: 





 Service Date/Time:  Monday, May 22, 2017 08:28 - CONCLUSION:  Several left 





 kidney stones and mild hydronephrosis. Recommend stone protocol CT for further 





 evaluation.     Mike Watts MD 








Objective Remarks


GENERAL: 67-year-old male, resting in bed on nasal BiPAP


SKIN: Skin warm and dry no rash


HEAD: Normocephalic.


EYES: No scleral icterus. No injection or drainage. 


NECK: Supple, trachea midline. No JVD or lymphadenopathy.


CARDIOVASCULAR: Tachycardic rate and rhythm without murmurs, gallops, or rubs. 


RESPIRATORY: Breath sounds equal bilaterally.  Scattered rhonchi, no wheezing 

or crackles.  Slightly tachypneic/dyspneic


GASTROINTESTINAL: Abdomen soft, non-tender, nondistended.  Positive bowel 

sounds Colostomy in place.  Right-sided nephrostomy tube clean dry and intact


MUSCULOSKELETAL: Trace bilateral lower extremity edema


BACK: Nontender without obvious deformity.  Well-healed sacral decubitus ulcer


NEURO: Alert awake oriented.  Patient is quadriplegic C5 C7 incomplete. 4/5 

muscle strength upper extremity, 1/5 in [place], neurogenic bladder


Vascular Central Line Catheter:  Yes


Assessment to:  Continue


Line:  Central Venous Catheter


Side:  Left


Location:  Internal, Jugular





A/P


Assessment and Plan


NEURO: 





Incomplete quadriplegia from cervical spine injury C5 through C7


Neurogenic bladder





-Minimize sedation, hold home meds (gabapentin 300 twice a day, desipramine 25 

daily and baclofen 10 mg twice a day)


-PT evaluation and treatment





RESP: 





Acute hypercapnic respiratory failure secondary to volume overload


History of COPD asthma


Obstructive sleep apnea/nasal CPAP





-Nasal cannula oxygen, if O2 sats dropped to consider Ventimask/BiPAP.


-Continue Symbicort 160/4.5 2 puffs twice a day


-DuoNeb every 6 hours while awake and every 4 hours when necessary


-Use home nasal CPAP





CV: 





Septic shock - resolving


Lactic acidemia 


Hypertension


Dyslipidemia





-13 L positive fluid balance over the last 4 days due to fluid resuscitation 

and septic shock.  Adequately resuscitated currently.  Decreased IV fluids to 

50 cc per hour and give Bumex 1 mg  IV with 25 g albumin in order to mobilize 

fluid in view of evidence of fluid overload on chest x-ray 5/23..


Lactic acid clearing


-Holding home antihypertensives including losartan 100 mg daily and 

hydrochlorothiazide 25 mg daily.  


Continuing home lipid-lowering medication simvastatin 20 mg daily/noted on 

pravastatin 40 mill grams daily hospital substitution. 


continue aspirin 81 mg daily


-Vasopressin infusion to keep map above 65, currently off.





GI: 





Status post colostomy


Hypo-albuminemia


Hiatal hernia


Gastroesophageal reflux disease





-Heart healthy diet. PPI on Prilosec 20 daily at home.  Protonix substitution


-Colostomy management





: 





Acute kidney injury secondary to sepsis and dehydration 


Pyelonephritis


Multiple renal calculi


Neurogenic bladder





-Monitor renal function closely. Lira catheter. 


-IVF as above


-Renal ultrasound with multiple renal calculi.  Patient requests that his 

ultrasound results be sent to his urologist Dr. Ramírez.  


Nephrology - placed right-sided nephrostomy tube


Continue Detrol 4 mg daily.


Holding oxybutynin 5 mill grams twice a day





ID: 





Septic shock


Escherichia coli bacteremia


UTI/ pyelonephritis in the setting of multiple renal calculi/neurogenic bladder





-Follow-up on blood urine cultures, sputum culture.


Urine cultures/blood culture from 5/20 growing Escherichia coli sensitive to 

Rocephin 


-Patient was initially on IV vancomycin and Levaquin and Zosyn.  Zosyn switched 

to meropenem on 5/22.  Patient evaluated by ID who discontinued all antibiotics 

and switched to IV Rocephin.


-Single dose of gentamicin given 5/21


-ID consult noted.  Antibiotics per ID.





HEME: 





Leukocytosis


Normocytic anemia


Thrombocytopenia





-Monitor CBC, CMP, coags





ENDO: 





Type 2 diabetes


Hyperglycemia





-Sliding-scale insulin


-Electrolyte replacement per protocol





PROPH: 





-Bilateral lower extremity SCDs. Lovenox 40 mg sq daily. PPI 





LINES:





-Left IJ central line (5/21)





Condition remains critical with septic shock secondary to Escherichia coli UTI/

bacteremia





CC time 45 min








Gal Quesada MD May 24, 2017 05:15

## 2017-05-24 NOTE — HHI.IDPN
Subjective


Subjective


Remarks


doing better, but WBC jumped up to 19 K


+ low grade fevers


no nw co


UOP improved


no diarrhea


on RA and BIPAP at night (baseline)


Antibiotics


CFTX


Past Medical History


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


Neurogenic bladder


Type 2 diabetes


Hypertension


Dyslipidemia


Obstructive sleep apnea


Asthma COPD


Kidney stones


Past Surgical History


Cervical spine fusion in 2007


Colostomy in 2007


Allergies:  


Coded Allergies:  


     Morphine (Verified  Allergy, Severe, 5/20/17)


 HALLUCINATIONS


     Sulfa (Verified  Allergy, Severe, 5/20/17)


 SKIN RASH


     *MDRO Multi-Drug Resistant Organism (Verified  Allergy, Unknown, 5/22/17)


 MRSA PCR Positive 5/20/17


 MRSA 2008


 Enterococcus faecium VRE 2007


 Carbapenem-resistant Acinetobacter baumannii 2007





Objective


.





 Vital Signs








  Date Time  Temp Pulse Resp B/P Pulse Ox O2 Delivery O2 Flow Rate FiO2


 


5/24/17 18:00  86      


 


5/24/17 18:00  82 19 112/62 96   


 


5/24/17 17:00  80 22 111/64 97   


 


5/24/17 16:00  90      


 


5/24/17 16:00  90 20 115/64 97   


 


5/24/17 15:00 99.5 88 23 125/65 95   


 


5/24/17 14:00  80 23 101/58 99   


 


5/24/17 14:00  80      


 


5/24/17 13:45  90      


 


5/24/17 13:30  82      


 


5/24/17 13:00  92      


 


5/24/17 13:00  92 26 109/56 95   


 


5/24/17 12:00  88      


 


5/24/17 12:00 100.2 88 23 104/54 100   


 


5/24/17 11:15  88 22  98   


 


5/24/17 11:00  88 24 103/56 98   


 


5/24/17 10:00  92      


 


5/24/17 10:00  92 25 100/50 96   


 


5/24/17 09:34     95   21


 


5/24/17 09:00  86      


 


5/24/17 09:00  86 22 119/61 95   


 


5/24/17 08:00  80 19 122/66 99   


 


5/24/17 08:00  80      


 


5/24/17 07:00 99.1 72 17 122/64 99   


 


5/24/17 06:00  70      


 


5/24/17 06:00  74 17 121/67 97   


 


5/24/17 05:00  78 24 100/65 94   


 


5/24/17 04:00 99.0 86 20 100/65 95   


 


5/24/17 04:00  86      


 


5/24/17 03:00  72 18 115/62 100   


 


5/24/17 02:00  71      


 


5/24/17 02:00  78 20 113/63 97   


 


5/24/17 01:00  72 14 141/75 100   


 


5/24/17 00:00  67      


 


5/24/17 00:00 98.3 67 19 140/78 100   


 


5/23/17 23:00  61 18 139/72 100   


 


5/23/17 22:00  60 16 119/65 98   


 


5/23/17 22:00  59      


 


5/23/17 21:00  62 17 109/64 98   


 


5/23/17 20:35     96 Nasal Cannula 2.00 


 


5/23/17 20:00  64 18 113/66 100   


 


5/23/17 20:00  74      














 5/23/17 5/23/17 5/24/17





 15:00 23:00 07:00


 


Intake Total 1416 ml 1088 ml 1014 ml


 


Output Total 1300 ml 1600 ml 1105 ml


 


Balance 116 ml -512 ml -91 ml


 


   


 


Intake Oral 860 ml 240 ml 240 ml


 


IV Total 556 ml 848 ml 774 ml


 


Output Urine Total 1150 ml 1600 ml 1100 ml


 


Stool Total 150 ml 0 ml 0 ml


 


Drainage Total  0 ml 5 ml








.





Laboratory Tests








Test 5/23/17 5/24/17





 07:50 04:00


 


White Blood Count 9.3 TH/MM3 19.0 TH/MM3


 


Red Blood Count 3.60 MIL/MM3 3.92 MIL/MM3


 


Hemoglobin 9.7 GM/DL 10.8 GM/DL


 


Hematocrit 29.8 % 32.0 %


 


Mean Corpuscular Volume 82.8 FL 81.7 FL


 


Mean Corpuscular Hemoglobin 26.9 PG 27.6 PG


 


Mean Corpuscular Hemoglobin 32.5 % 33.8 %





Concent  


 


Red Cell Distribution Width 17.8 % 17.8 %


 


Platelet Count 152 TH/MM3 147 TH/MM3


 


Mean Platelet Volume 8.7 FL 8.7 FL


 


Neutrophils (%) (Auto) 93.3 % 93.6 %


 


Lymphocytes (%) (Auto) 3.4 % 4.4 %


 


Monocytes (%) (Auto) 0.2 % 1.2 %


 


Eosinophils (%) (Auto) 3.0 % 0.5 %


 


Basophils (%) (Auto) 0.1 % 0.3 %


 


Neutrophils # (Auto) 8.7 TH/MM3 17.8 TH/MM3


 


Lymphocytes # (Auto) 0.3 TH/MM3 0.8 TH/MM3


 


Monocytes # (Auto) 0.0 TH/MM3 0.2 TH/MM3


 


Eosinophils # (Auto) 0.3 TH/MM3 0.1 TH/MM3


 


Basophils # (Auto) 0.0 TH/MM3 0.1 TH/MM3


 


CBC Comment AUTO DIFF  AUTO DIFF 


 


Differential Comment AUTO DIFF AUTO DIFF





 CONFIRMED CONFIRMED


 


Platelet Estimate  LOW 


 


Platelet Morphology Comment  NORMAL 


 


Red Cell Morphology Comment  NORMAL 








Laboratory Tests








Test 5/23/17 5/23/17 5/23/17 5/24/17





 07:50 16:30 18:00 04:00


 


Sodium Level 140 MEQ/L  139 MEQ/L 140 MEQ/L


 


Potassium Level 3.3 MEQ/L  3.3 MEQ/L 3.4 MEQ/L


 


Chloride Level 105 MEQ/L  105 MEQ/L 105 MEQ/L


 


Carbon Dioxide Level 23.3 MEQ/L  24.3 MEQ/L 26.1 MEQ/L


 


Anion Gap 12 MEQ/L  10 MEQ/L 9 MEQ/L


 


Blood Urea Nitrogen 31 MG/DL  33 MG/DL 29 MG/DL


 


Creatinine 1.40 MG/DL  1.30 MG/DL 1.20 MG/DL


 


Estimat Glomerular Filtration 51 ML/MIN  55 ML/MIN 61 ML/MIN





Rate    


 


Random Glucose 153 MG/DL  237 MG/ MG/DL


 


Calcium Level 7.9 MG/DL  7.9 MG/DL 7.9 MG/DL


 


Total Bilirubin 0.4 MG/DL   0.2 MG/DL


 


Aspartate Amino Transf 32 U/L   25 U/L





(AST/SGOT)    


 


Alanine Aminotransferase 27 U/L   24 U/L





(ALT/SGPT)    


 


Alkaline Phosphatase 148 U/L   73 U/L


 


Total Protein 6.2 GM/DL   6.0 GM/DL


 


Albumin 2.1 GM/DL   1.9 GM/DL


 


Lactic Acid Level  1.7 mmol/L  


 


    





Test 5/24/17   





 15:10   


 


Potassium Level 3.1 MEQ/L   


 


Magnesium Level 1.7 MG/DL   








Microbiology








 Date/Time Procedure Status





Source Growth 


 


 5/22/17 09:30 Aerobic Blood Culture - Preliminary Resulted





Blood Other NO GROWTH IN 2 DAYS 





 5/22/17 09:30 Anaerobic Blood Culture - Preliminary Resulted





Blood Other NO GROWTH IN 2 DAYS 


 


 5/22/17 10:30 Aerobic Blood Culture - Preliminary Resulted





Blood Other NO GROWTH IN 2 DAYS 





 5/22/17 10:30 Anaerobic Blood Culture - Preliminary Resulted





Blood Other NO GROWTH IN 2 DAYS 








Imaging





Last Impressions








Chest X-Ray 5/24/17 0600 Signed





Impressions: 





 Service Date/Time:  Wednesday, May 24, 2017 06:09 - CONCLUSION:  Slight 





 worsening pulmonary edema.     ARGELIA Cochran MD 


 


Nephrostomy 5/23/17 0000 Signed





Impressions: 





 Service Date/Time:  Tuesday, May 23, 2017 16:31 - CONCLUSION: Uncomplicated 





 nephrostomy tube placement as above.     Mike Watts MD 


 


Abdomen/Pelvis CT 5/23/17 0000 Signed





Impressions: 





 Service Date/Time:  Tuesday, May 23, 2017 10:26 - CONCLUSION:  1. There are 





 innumerable nonobstructing stones in the right renal collecting system 

measuring 





 up to 15 x 11 mm. At the right UPJ there is a 15 x 8 mm stone causing moderate 





 right hydronephrosis. 2. There are 5 nonobstructing stones in left kidney 





 measuring up to 8 mm. There is no left hydronephrosis. 3. Multiple urinary 





 bladder stones are present. 4. Small volume of free fluid is present in the 





 right paracolic gutter region. 5. Nonacute findings include atelectasis versus 





 consolidation the lung bases, severe atherosclerotic disease, and fat-

containing 





 right abdominal wall hernia.     Mike Wade MD 


 


Renal Ultrasound 5/22/17 0000 Signed





Impressions: 





 Service Date/Time:  Monday, May 22, 2017 08:28 - CONCLUSION:  Several left 





 kidney stones and mild hydronephrosis. Recommend stone protocol CT for further 





 evaluation.     Mike Watts MD 








Physical Exam


GENERAL: This is a chronically ill, patient with quadriparesis, in no apparent 

distress.





SKIN: No rashes, ecchymoses or lesions. Cool and dry.


NECK: Trachea midline.


CARDIOVASCULAR: Regular rate and rhythm without murmurs, gallops, or rubs. 


RESPIRATORY: On BIPAP


Clear to auscultation. Breath sounds equal bilaterally. No wheezes, rales, or 

rhonchi.  


GASTROINTESTINAL: Abdomen soft, Obese non-tender, nondistended.


MUSCULOSKELETAL: Extremities without  cyanosis, 


: hughes in place withclear yellow  urine


R nephrostomy in place with clear celena urine


NEUROLOGICAL: awake; alert, normla speech; fairly strong BUE exceppt fine 

finger movement s


BLE complatrely plegic





Assessment & Plan


Remarks


 Severe sepsis


   - clincially improved 


UTI, E.coli, Proteus with secondary bacteremia  2/2 E.coli


   -complicated UTI , in the settings of R sided  obstructive uropahty 2/2 

kidney stone


   - sp R nephrostomy


   - both E.coli and Proteu are S to levaquine


Chronic hughes 2/2 neurogenic bladder


Imcomplete quadriplegia


ARF - resolving


Leukocytosis - markedly worse today 


Lactic acidosis - resolved








Plan: cont  cont IV Ceftriaxone 2 g daily


   - anticipate transition to Levaquine IV/PO if remains clincally stable and 

sepsis physiology


   - anticiapate 2 weeks of total abx course





   - monitor WBC and fever 





dw Alpa Cohen MD May 24, 2017 19:21

## 2017-05-24 NOTE — RADHPO
EXAM DATE/TIME:  05/24/2017 06:09 

 

[facility] COMPARISON:     

CHEST SINGLE AP, May 23, 2017, 6:30.

 

                     

INDICATIONS :     

Respiratory distress.

                     

 

MEDICAL HISTORY :            

Sepsis. Renal insufficiency.   

 

SURGICAL HISTORY :        

Nephrostomy, right.

 

ENCOUNTER:     

Subsequent                                        

 

ACUITY:     

4 - 6 days      

 

PAIN SCORE:     

Non-responsive.

 

LOCATION:     

Bilateral chest 

 

FINDINGS:     

Left IJ line is present with tip overlapping the expected region of the SVC. Perivascular pulmonary e
jordyn has not significantly changed on the right side, however possibly slightly worse in left lung ba
se. The rest of the examination has not significantly changed.

 

CONCLUSION:     

Slight worsening pulmonary edema.

 

 

 

 ARGELIA Cochran MD on May 24, 2017 at 6:33           

Board Certified Radiologist.

 This report was verified electronically.

## 2017-05-25 VITALS
DIASTOLIC BLOOD PRESSURE: 67 MMHG | SYSTOLIC BLOOD PRESSURE: 121 MMHG | OXYGEN SATURATION: 95 % | HEART RATE: 74 BPM | RESPIRATION RATE: 17 BRPM | TEMPERATURE: 99.2 F

## 2017-05-25 VITALS
SYSTOLIC BLOOD PRESSURE: 107 MMHG | TEMPERATURE: 99.8 F | RESPIRATION RATE: 21 BRPM | HEART RATE: 93 BPM | OXYGEN SATURATION: 95 % | DIASTOLIC BLOOD PRESSURE: 50 MMHG

## 2017-05-25 VITALS
DIASTOLIC BLOOD PRESSURE: 59 MMHG | SYSTOLIC BLOOD PRESSURE: 121 MMHG | HEART RATE: 74 BPM | OXYGEN SATURATION: 93 % | RESPIRATION RATE: 22 BRPM

## 2017-05-25 VITALS — OXYGEN SATURATION: 97 %

## 2017-05-25 VITALS
DIASTOLIC BLOOD PRESSURE: 51 MMHG | OXYGEN SATURATION: 96 % | RESPIRATION RATE: 19 BRPM | HEART RATE: 86 BPM | SYSTOLIC BLOOD PRESSURE: 102 MMHG

## 2017-05-25 VITALS
SYSTOLIC BLOOD PRESSURE: 128 MMHG | HEART RATE: 78 BPM | RESPIRATION RATE: 16 BRPM | DIASTOLIC BLOOD PRESSURE: 70 MMHG | OXYGEN SATURATION: 96 %

## 2017-05-25 VITALS
SYSTOLIC BLOOD PRESSURE: 109 MMHG | RESPIRATION RATE: 17 BRPM | OXYGEN SATURATION: 95 % | TEMPERATURE: 100 F | DIASTOLIC BLOOD PRESSURE: 54 MMHG | HEART RATE: 85 BPM

## 2017-05-25 VITALS
DIASTOLIC BLOOD PRESSURE: 59 MMHG | TEMPERATURE: 98.3 F | HEART RATE: 77 BPM | RESPIRATION RATE: 21 BRPM | SYSTOLIC BLOOD PRESSURE: 123 MMHG | OXYGEN SATURATION: 97 %

## 2017-05-25 VITALS
SYSTOLIC BLOOD PRESSURE: 104 MMHG | DIASTOLIC BLOOD PRESSURE: 55 MMHG | OXYGEN SATURATION: 96 % | RESPIRATION RATE: 16 BRPM | HEART RATE: 72 BPM

## 2017-05-25 VITALS
RESPIRATION RATE: 19 BRPM | DIASTOLIC BLOOD PRESSURE: 67 MMHG | TEMPERATURE: 99.3 F | OXYGEN SATURATION: 95 % | SYSTOLIC BLOOD PRESSURE: 113 MMHG | HEART RATE: 80 BPM

## 2017-05-25 VITALS
HEART RATE: 74 BPM | DIASTOLIC BLOOD PRESSURE: 56 MMHG | RESPIRATION RATE: 21 BRPM | OXYGEN SATURATION: 96 % | SYSTOLIC BLOOD PRESSURE: 124 MMHG

## 2017-05-25 VITALS
DIASTOLIC BLOOD PRESSURE: 60 MMHG | RESPIRATION RATE: 16 BRPM | TEMPERATURE: 99.3 F | OXYGEN SATURATION: 97 % | HEART RATE: 81 BPM | SYSTOLIC BLOOD PRESSURE: 145 MMHG

## 2017-05-25 VITALS
OXYGEN SATURATION: 95 % | DIASTOLIC BLOOD PRESSURE: 47 MMHG | SYSTOLIC BLOOD PRESSURE: 110 MMHG | RESPIRATION RATE: 22 BRPM | HEART RATE: 85 BPM

## 2017-05-25 VITALS
HEART RATE: 82 BPM | RESPIRATION RATE: 17 BRPM | SYSTOLIC BLOOD PRESSURE: 103 MMHG | DIASTOLIC BLOOD PRESSURE: 52 MMHG | TEMPERATURE: 99 F | OXYGEN SATURATION: 99 %

## 2017-05-25 VITALS
OXYGEN SATURATION: 98 % | HEART RATE: 77 BPM | SYSTOLIC BLOOD PRESSURE: 130 MMHG | DIASTOLIC BLOOD PRESSURE: 59 MMHG | RESPIRATION RATE: 18 BRPM

## 2017-05-25 VITALS
OXYGEN SATURATION: 95 % | DIASTOLIC BLOOD PRESSURE: 53 MMHG | RESPIRATION RATE: 20 BRPM | HEART RATE: 77 BPM | SYSTOLIC BLOOD PRESSURE: 100 MMHG

## 2017-05-25 VITALS
HEART RATE: 78 BPM | OXYGEN SATURATION: 99 % | RESPIRATION RATE: 12 BRPM | DIASTOLIC BLOOD PRESSURE: 78 MMHG | SYSTOLIC BLOOD PRESSURE: 119 MMHG

## 2017-05-25 VITALS
RESPIRATION RATE: 23 BRPM | HEART RATE: 72 BPM | SYSTOLIC BLOOD PRESSURE: 104 MMHG | DIASTOLIC BLOOD PRESSURE: 59 MMHG | OXYGEN SATURATION: 95 %

## 2017-05-25 VITALS
DIASTOLIC BLOOD PRESSURE: 65 MMHG | RESPIRATION RATE: 22 BRPM | OXYGEN SATURATION: 98 % | HEART RATE: 92 BPM | SYSTOLIC BLOOD PRESSURE: 137 MMHG

## 2017-05-25 VITALS
DIASTOLIC BLOOD PRESSURE: 58 MMHG | SYSTOLIC BLOOD PRESSURE: 112 MMHG | OXYGEN SATURATION: 94 % | HEART RATE: 78 BPM | RESPIRATION RATE: 16 BRPM

## 2017-05-25 VITALS — OXYGEN SATURATION: 93 %

## 2017-05-25 VITALS
SYSTOLIC BLOOD PRESSURE: 96 MMHG | OXYGEN SATURATION: 91 % | RESPIRATION RATE: 18 BRPM | DIASTOLIC BLOOD PRESSURE: 61 MMHG | HEART RATE: 80 BPM

## 2017-05-25 VITALS
OXYGEN SATURATION: 98 % | SYSTOLIC BLOOD PRESSURE: 114 MMHG | DIASTOLIC BLOOD PRESSURE: 58 MMHG | HEART RATE: 86 BPM | RESPIRATION RATE: 29 BRPM

## 2017-05-25 VITALS
TEMPERATURE: 99.4 F | OXYGEN SATURATION: 97 % | HEART RATE: 82 BPM | RESPIRATION RATE: 16 BRPM | SYSTOLIC BLOOD PRESSURE: 109 MMHG | DIASTOLIC BLOOD PRESSURE: 64 MMHG

## 2017-05-25 VITALS
OXYGEN SATURATION: 95 % | RESPIRATION RATE: 20 BRPM | DIASTOLIC BLOOD PRESSURE: 56 MMHG | SYSTOLIC BLOOD PRESSURE: 116 MMHG | HEART RATE: 75 BPM | TEMPERATURE: 99.1 F

## 2017-05-25 VITALS
DIASTOLIC BLOOD PRESSURE: 58 MMHG | RESPIRATION RATE: 22 BRPM | HEART RATE: 72 BPM | OXYGEN SATURATION: 97 % | SYSTOLIC BLOOD PRESSURE: 107 MMHG

## 2017-05-25 LAB
ALP SERPL-CCNC: 73 U/L (ref 45–117)
ALT SERPL-CCNC: 23 U/L (ref 12–78)
ANION GAP SERPL CALC-SCNC: 8 MEQ/L (ref 5–15)
AST SERPL-CCNC: 18 U/L (ref 15–37)
BASOPHILS # BLD AUTO: 0 TH/MM3 (ref 0–0.2)
BASOPHILS NFR BLD: 0.1 % (ref 0–2)
BILIRUB SERPL-MCNC: 0.4 MG/DL (ref 0.2–1)
BUN SERPL-MCNC: 26 MG/DL (ref 7–18)
CHLORIDE SERPL-SCNC: 103 MEQ/L (ref 98–107)
EOSINOPHIL # BLD: 0.2 TH/MM3 (ref 0–0.4)
EOSINOPHIL NFR BLD: 1.4 % (ref 0–4)
ERYTHROCYTE [DISTWIDTH] IN BLOOD BY AUTOMATED COUNT: 17 % (ref 11.6–17.2)
GFR SERPLBLD BASED ON 1.73 SQ M-ARVRAT: 77 ML/MIN (ref 89–?)
HCO3 BLD-SCNC: 27.7 MEQ/L (ref 21–32)
HCT VFR BLD CALC: 30.3 % (ref 39–51)
HEMO FLAGS: (no result)
LYMPHOCYTES # BLD AUTO: 1.3 TH/MM3 (ref 1–4.8)
LYMPHOCYTES NFR BLD AUTO: 7.9 % (ref 9–44)
MAGNESIUM SERPL-MCNC: 1.6 MG/DL (ref 1.5–2.5)
MCH RBC QN AUTO: 26.4 PG (ref 27–34)
MCHC RBC AUTO-ENTMCNC: 33 % (ref 32–36)
MCV RBC AUTO: 80.2 FL (ref 80–100)
MONOCYTES NFR BLD: 3 % (ref 0–8)
NEUTROPHILS # BLD AUTO: 14.5 TH/MM3 (ref 1.8–7.7)
NEUTROPHILS NFR BLD AUTO: 87.6 % (ref 16–70)
PLATELET # BLD: 148 TH/MM3 (ref 150–450)
POTASSIUM SERPL-SCNC: 3.4 MEQ/L (ref 3.5–5.1)
RBC # BLD AUTO: 3.78 MIL/MM3 (ref 4.5–5.9)
SODIUM SERPL-SCNC: 139 MEQ/L (ref 136–145)
WBC # BLD AUTO: 16.5 TH/MM3 (ref 4–11)

## 2017-05-25 RX ADMIN — PHENYTOIN SODIUM SCH MLS/HR: 50 INJECTION INTRAMUSCULAR; INTRAVENOUS at 23:16

## 2017-05-25 RX ADMIN — MAGNESIUM SULFATE IN DEXTROSE SCH MLS/HR: 10 INJECTION, SOLUTION INTRAVENOUS at 06:35

## 2017-05-25 RX ADMIN — Medication SCH ML: at 21:14

## 2017-05-25 RX ADMIN — BUDESONIDE AND FORMOTEROL FUMARATE DIHYDRATE SCH PUFF: 160; 4.5 AEROSOL RESPIRATORY (INHALATION) at 21:13

## 2017-05-25 RX ADMIN — POTASSIUM CHLORIDE PRN MLS/HR: 400 INJECTION, SOLUTION INTRAVENOUS at 21:24

## 2017-05-25 RX ADMIN — SODIUM CHLORIDE SCH MLS/HR: 900 INJECTION INTRAVENOUS at 08:56

## 2017-05-25 RX ADMIN — OXYCODONE HYDROCHLORIDE AND ACETAMINOPHEN SCH MG: 500 TABLET ORAL at 21:13

## 2017-05-25 RX ADMIN — IPRATROPIUM BROMIDE AND ALBUTEROL SULFATE SCH AMPULE: .5; 3 SOLUTION RESPIRATORY (INHALATION) at 08:27

## 2017-05-25 RX ADMIN — INSULIN ASPART SCH: 100 INJECTION, SOLUTION INTRAVENOUS; SUBCUTANEOUS at 12:00

## 2017-05-25 RX ADMIN — CHLORHEXIDINE GLUCONATE SCH PACK: 500 CLOTH TOPICAL at 03:45

## 2017-05-25 RX ADMIN — BUDESONIDE AND FORMOTEROL FUMARATE DIHYDRATE SCH PUFF: 160; 4.5 AEROSOL RESPIRATORY (INHALATION) at 09:52

## 2017-05-25 RX ADMIN — TOLTERODINE TARTRATE SCH MG: 4 CAPSULE, EXTENDED RELEASE ORAL at 09:52

## 2017-05-25 RX ADMIN — PROMETHAZINE HYDROCHLORIDE PRN MG: 25 TABLET ORAL at 03:43

## 2017-05-25 RX ADMIN — MAGNESIUM SULFATE IN DEXTROSE SCH MLS/HR: 10 INJECTION, SOLUTION INTRAVENOUS at 05:32

## 2017-05-25 RX ADMIN — Medication SCH ML: at 09:52

## 2017-05-25 RX ADMIN — ENOXAPARIN SODIUM SCH MG: 40 INJECTION SUBCUTANEOUS at 09:53

## 2017-05-25 RX ADMIN — PANTOPRAZOLE SODIUM SCH MG: 20 TABLET, DELAYED RELEASE ORAL at 09:52

## 2017-05-25 RX ADMIN — PRAVASTATIN SODIUM SCH MG: 40 TABLET ORAL at 21:13

## 2017-05-25 RX ADMIN — POTASSIUM CHLORIDE PRN MLS/HR: 200 INJECTION, SOLUTION INTRAVENOUS at 09:56

## 2017-05-25 RX ADMIN — PHENYTOIN SODIUM SCH MLS/HR: 50 INJECTION INTRAMUSCULAR; INTRAVENOUS at 03:16

## 2017-05-25 RX ADMIN — IPRATROPIUM BROMIDE AND ALBUTEROL SULFATE SCH AMPULE: .5; 3 SOLUTION RESPIRATORY (INHALATION) at 19:28

## 2017-05-25 RX ADMIN — INSULIN ASPART SCH: 100 INJECTION, SOLUTION INTRAVENOUS; SUBCUTANEOUS at 16:30

## 2017-05-25 RX ADMIN — OXYCODONE HYDROCHLORIDE AND ACETAMINOPHEN SCH MG: 500 TABLET ORAL at 09:52

## 2017-05-25 RX ADMIN — CEFTRIAXONE SODIUM SCH MLS/HR: 2 INJECTION, POWDER, FOR SOLUTION INTRAMUSCULAR; INTRAVENOUS at 16:24

## 2017-05-25 RX ADMIN — INSULIN ASPART SCH: 100 INJECTION, SOLUTION INTRAVENOUS; SUBCUTANEOUS at 06:37

## 2017-05-25 RX ADMIN — ASPIRIN SCH MG: 81 TABLET ORAL at 09:52

## 2017-05-25 RX ADMIN — IPRATROPIUM BROMIDE AND ALBUTEROL SULFATE SCH AMPULE: .5; 3 SOLUTION RESPIRATORY (INHALATION) at 14:58

## 2017-05-25 RX ADMIN — INSULIN ASPART SCH: 100 INJECTION, SOLUTION INTRAVENOUS; SUBCUTANEOUS at 21:32

## 2017-05-25 NOTE — RADHPO
EXAM DATE/TIME:  05/25/2017 03:53 

 

[facility] COMPARISON:     

CHEST SINGLE AP, May 24, 2017, 6:09.

 

                     

INDICATIONS :     

Respiratory distress.

                     

 

MEDICAL HISTORY :            

Sepsis. Renal insufficiency.   

 

SURGICAL HISTORY :        

Nephrostomy, right.

 

ENCOUNTER:     

Subsequent                                        

 

ACUITY:     

4 - 6 days      

 

PAIN SCORE:     

Non-responsive.

 

LOCATION:     

Bilateral chest 

 

FINDINGS:     

A single view of the chest demonstrates the lungs to be symmetrically aerated without evidence of mas
s, infiltrate or effusion.  Left IJ central line in good position. The right hemidiaphragm remains el
evated. The cardiomediastinal contours are unremarkable.  Osseous structures are intact.

 

CONCLUSION:     

Normal examination status post left central line placement.  

 

 

 

 Roberth Hernández MD on May 25, 2017 at 4:03           

Board Certified Radiologist.

 This report was verified electronically.

## 2017-05-25 NOTE — HHI.IDPN
Subjective


Subjective


Remarks


doing better, but WBC slightly dowbn


cont to have  low grade fevers


no nw co


UOP improved


no diarrhea


on RA and BIPAP at night (baseline)


Antibiotics


CFTX


Past Medical History


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


Neurogenic bladder


Type 2 diabetes


Hypertension


Dyslipidemia


Obstructive sleep apnea


Asthma COPD


Kidney stones


Past Surgical History


Cervical spine fusion in 2007


Colostomy in 2007


Allergies:  


Coded Allergies:  


     Morphine (Verified  Allergy, Severe, 5/20/17)


 HALLUCINATIONS


     Sulfa (Verified  Allergy, Severe, 5/20/17)


 SKIN RASH


     *MDRO Multi-Drug Resistant Organism (Verified  Allergy, Unknown, 5/22/17)


 MRSA PCR Positive 5/20/17


 MRSA 2008


 Enterococcus faecium VRE 2007


 Carbapenem-resistant Acinetobacter baumannii 2007





Objective


.





 Vital Signs








  Date Time  Temp Pulse Resp B/P Pulse Ox O2 Delivery O2 Flow Rate FiO2


 


5/25/17 16:00 99.2 74 17 121/67 95   


 


5/25/17 16:00  74      


 


5/25/17 15:00  77 18 130/59 98   


 


5/25/17 14:00  81      


 


5/25/17 14:00 99.3 81 16 145/60 97   


 


5/25/17 13:00  78 12 119/78 99   


 


5/25/17 12:00 99.0 82 17 103/52 99   


 


5/25/17 12:00  82      


 


5/25/17 11:00  78 16 128/70 96   


 


5/25/17 10:00  85      


 


5/25/17 10:00 100.0 85 17 109/54 95   


 


5/25/17 09:00  80 18 96/61 91   


 


5/25/17 08:28     93   21


 


5/25/17 08:00  86      


 


5/25/17 08:00  86 19 102/51 96   


 


5/25/17 07:00 99.8 93 21 107/50 95   


 


5/25/17 06:00  85      


 


5/25/17 06:00  80 22 110/47 95   


 


5/25/17 05:00  92 22 137/65 98   


 


5/25/17 04:00  77      


 


5/25/17 04:00 99.3 80 19 113/67 95   


 


5/25/17 03:00  72 22 107/58 97   


 


5/25/17 02:00  74 20 100/53 95   


 


5/25/17 02:00  77      


 


5/25/17 01:00  74 21 124/56 96   


 


5/25/17 00:00  77      


 


5/25/17 00:00 98.3 76 21 123/59 97   


 


5/25/17 00:00  76      


 


5/24/17 23:00 98.9 74 20 134/65 98   


 


5/24/17 22:00  75      


 


5/24/17 22:00  76 23 118/59 100   


 


5/24/17 21:15  82 34 108/61 95   


 


5/24/17 20:25     95   21


 


5/24/17 20:00 99.2 82 22 112/65 97   


 


5/24/17 20:00  74      


 


5/24/17 19:00  80 17 126/67 100   


 


5/24/17 18:00  86      


 


5/24/17 18:00  82 19 112/62 96   


 


5/24/17 17:00  80 22 111/64 97   














 5/24/17 5/24/17 5/25/17





 15:00 23:00 07:00


 


Intake Total 1172 ml 1212 ml 836 ml


 


Output Total 3600 ml 2150 ml 900 ml


 


Balance -2428 ml -938 ml -64 ml


 


   


 


Intake Oral 360 ml 360 ml 480 ml


 


IV Total 812 ml 652 ml 356 ml


 


Other  200 ml 


 


Output Urine Total 3300 ml 1350 ml 550 ml


 


Stool Total  150 ml 


 


Drainage Total 300 ml 650 ml 350 ml


 


# Bowel Movements  0 0








.





Laboratory Tests








Test 5/24/17 5/25/17





 04:00 04:46


 


White Blood Count 19.0 TH/MM3 16.5 TH/MM3


 


Red Blood Count 3.92 MIL/MM3 3.78 MIL/MM3


 


Hemoglobin 10.8 GM/DL 10.0 GM/DL


 


Hematocrit 32.0 % 30.3 %


 


Mean Corpuscular Volume 81.7 FL 80.2 FL


 


Mean Corpuscular Hemoglobin 27.6 PG 26.4 PG


 


Mean Corpuscular Hemoglobin 33.8 % 33.0 %





Concent  


 


Red Cell Distribution Width 17.8 % 17.0 %


 


Platelet Count 147 TH/MM3 148 TH/MM3


 


Mean Platelet Volume 8.7 FL 9.4 FL


 


Neutrophils (%) (Auto) 93.6 % 87.6 %


 


Lymphocytes (%) (Auto) 4.4 % 7.9 %


 


Monocytes (%) (Auto) 1.2 % 3.0 %


 


Eosinophils (%) (Auto) 0.5 % 1.4 %


 


Basophils (%) (Auto) 0.3 % 0.1 %


 


Neutrophils # (Auto) 17.8 TH/MM3 14.5 TH/MM3


 


Lymphocytes # (Auto) 0.8 TH/MM3 1.3 TH/MM3


 


Monocytes # (Auto) 0.2 TH/MM3 0.5 TH/MM3


 


Eosinophils # (Auto) 0.1 TH/MM3 0.2 TH/MM3


 


Basophils # (Auto) 0.1 TH/MM3 0.0 TH/MM3


 


CBC Comment AUTO DIFF  DIFF FINAL 


 


Differential Comment AUTO DIFF  





 CONFIRMED 


 


Platelet Estimate LOW  


 


Platelet Morphology Comment NORMAL  


 


Red Cell Morphology Comment NORMAL  








Laboratory Tests








Test 5/23/17 5/24/17 5/24/17 5/25/17





 18:00 04:00 15:10 04:46


 


Sodium Level 139 MEQ/L 140 MEQ/L  139 MEQ/L


 


Potassium Level 3.3 MEQ/L 3.4 MEQ/L 3.1 MEQ/L 3.4 MEQ/L


 


Chloride Level 105 MEQ/L 105 MEQ/L  103 MEQ/L


 


Carbon Dioxide Level 24.3 MEQ/L 26.1 MEQ/L  27.7 MEQ/L


 


Anion Gap 10 MEQ/L 9 MEQ/L  8 MEQ/L


 


Blood Urea Nitrogen 33 MG/DL 29 MG/DL  26 MG/DL


 


Creatinine 1.30 MG/DL 1.20 MG/DL  0.98 MG/DL


 


Estimat Glomerular Filtration 55 ML/MIN 61 ML/MIN  77 ML/MIN





Rate    


 


Random Glucose 237 MG/ MG/DL  211 MG/DL


 


Calcium Level 7.9 MG/DL 7.9 MG/DL  8.0 MG/DL


 


Total Bilirubin  0.2 MG/DL  0.4 MG/DL


 


Aspartate Amino Transf  25 U/L  18 U/L





(AST/SGOT)    


 


Alanine Aminotransferase  24 U/L  23 U/L





(ALT/SGPT)    


 


Alkaline Phosphatase  73 U/L  73 U/L


 


Total Protein  6.0 GM/DL  6.0 GM/DL


 


Albumin  1.9 GM/DL  2.1 GM/DL


 


Magnesium Level   1.7 MG/DL 1.6 MG/DL


 


Lactic Acid Level    2.0 mmol/L


 


Phosphorus Level    1.5 MG/DL


 


    





Test 5/25/17   





 15:55   


 


Potassium Level 3.5 MEQ/L   








Imaging





Last Impressions








Chest X-Ray 5/25/17 0600 Signed





Impressions: 





 Service Date/Time:  Thursday, May 25, 2017 03:53 - CONCLUSION:  Normal 





 examination status post left central line placement.       Roberth Hernández MD 


 


Nephrostomy 5/23/17 0000 Signed





Impressions: 





 Service Date/Time:  Tuesday, May 23, 2017 16:31 - CONCLUSION: Uncomplicated 





 nephrostomy tube placement as above.     Mike Watts MD 


 


Abdomen/Pelvis CT 5/23/17 0000 Signed





Impressions: 





 Service Date/Time:  Tuesday, May 23, 2017 10:26 - CONCLUSION:  1. There are 





 innumerable nonobstructing stones in the right renal collecting system 

measuring 





 up to 15 x 11 mm. At the right UPJ there is a 15 x 8 mm stone causing moderate 





 right hydronephrosis. 2. There are 5 nonobstructing stones in left kidney 





 measuring up to 8 mm. There is no left hydronephrosis. 3. Multiple urinary 





 bladder stones are present. 4. Small volume of free fluid is present in the 





 right paracolic gutter region. 5. Nonacute findings include atelectasis versus 





 consolidation the lung bases, severe atherosclerotic disease, and fat-

containing 





 right abdominal wall hernia.     Mike Wade MD 


 


Renal Ultrasound 5/22/17 0000 Signed





Impressions: 





 Service Date/Time:  Monday, May 22, 2017 08:28 - CONCLUSION:  Several left 





 kidney stones and mild hydronephrosis. Recommend stone protocol CT for further 





 evaluation.     Mike Watts MD 








Physical Exam


GENERAL: This is a chronically ill, patient with quadriparesis, in no apparent 

distress.


LINES: L SCV TLVC in place with no skin changes


SKIN: No rashes, ecchymoses or lesions. Cool and dry.


NECK: Trachea midline.


CARDIOVASCULAR: Regular rate and rhythm without murmurs, gallops, or rubs. 


RESPIRATORY: On BIPAP


Clear to auscultation. Breath sounds equal bilaterally. No wheezes, rales, or 

rhonchi.  


GASTROINTESTINAL: Abdomen soft, Obese non-tender, nondistended.


MUSCULOSKELETAL: Extremities without  cyanosis, 


Prominent B/L edema 3+


: hughes in place with clear yellow  urine


R nephrostomy in place with clear celena urine


NEUROLOGICAL: awake; alert, normla speech; fairly strong BUE exceppt fine 

finger movement s


BLE complatrely plegic





Assessment & Plan


Remarks


 Severe sepsis


   - clincially improved 


UTI, E.coli, Proteus with secondary bacteremia  2/2 E.coli


   -complicated UTI , in the settings of R sided  obstructive uropahty 2/2 

kidney stone


   - sp R nephrostomy


   - both E.coli and Proteu are S to levaquine


Chronic hughes 2/2 neurogenic bladder


Imcomplete quadriplegia


ARF - resolving


Leukocytosis - gradually improving


Lactic acidosis - resolved


Persistent low grade fever 








Plan: cont  cont IV Ceftriaxone 2 g daily


   - anticipate transition to Levaquine IV/PO if remains clincally stable and 

sepsis physiology


   - anticiapate 2 weeks of total abx course





   - monitor WBC and fever 


   - likley to be discharged soon if WBC cont to go down and afebrile





dw Alpa Cohen MD May 25, 2017 17:01

## 2017-05-25 NOTE — HHI.CCPN
Subjective


Remarks/Hospital Course


Patient is a 67-year-old  male with past medical history of incomplete 

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 

self-catheterization, type 2 diabetes, hypertension, COPD/asthma, obstructive 

sleep apnea and obesity who presented to the [facility] emergency department 

with cough congestion, fever chills and weakness.  Patient states the symptoms 

started 5/19/17.  Cough is nonproductive.  Patient also feels that he may be 

having a urinary tract infection as he has seen color change, urine was bloody 

and somewhat cloudy.  In the ER patient had a blood pressure of 93/64, MAXIMUM 

TEMPERATURE was 103.  Lab workup showed that white count was 17.4, lactic acid 

2.4.  BUN was 27 creatinine 1.6 his previous creatinine 2015 was 0.62.  UA 

showed evidence of UTI.  Patient was admitted to critical care service to the 

ICU.  He received 2 L IV fluid boluses and also was placed on vancomycin and 

azithromycin and Zosyn. I evaluated the patient in ICU.  He appears ill but 

improving.  Blood pressure has stabilized.  He is making urine.  Fever trending 

down





5/22: Became hypotensive tachycardic yesterday evening with fever up to 104.  

Central line was placed, Levaquin added and single dose of gentamicin.  

Currently on vasopressin.  Mike fever 101 at 4 AM.  White count increasing 

16.6 today, creatinine stable at 1.7. UO 2L in 24 hour. Bld cx E Coli, urine cx 

GNR.  As the patient is still in septic shock, stopped Zosyn start meropenem.  

ID consult pending.


5/23: Off vasopressin.  Became short of breath this morning.  Chest x-ray 

suggests fluid overload and fluids cut down to 60 cc per hour and given Lasix 

20 mg IV stat.  Urine and blood cultures from 5/20 growing Escherichia coli 

sensitive to Rocephin. 


5/24:  Currently on nasal CPAP.  Diuresed yesterday.  Right-sided nephrostomy 

tube placed.  Breathing better this AM.  Tolerating diet.





Subjective





5/25:  Currently on nasal CPAP.  Diuresed 3 L yesterday.  Right sided 

nephrostomy -950.  Positive BM.  Breathing better this evening.








Objective





 Vital Signs








  Date Time  Temp Pulse Resp B/P Pulse Ox O2 Delivery O2 Flow Rate FiO2


 


5/25/17 04:00  77      


 


5/25/17 04:00 99.3  19 113/67 95   


 


5/24/17 20:25        21


 


5/23/17 20:35      Nasal Cannula 2.00 








 Intake and Output








 5/24/17 5/24/17 5/25/17





 08:00 16:00 00:00


 


Intake Total 1014 ml 1172 ml 1212 ml


 


Output Total 3105 ml 1600 ml 2150 ml


 


Balance -2091 ml -428 ml -938 ml








Result Diagram:  


5/25/17 0446                                                                   

             5/24/17 1510





Other Results





Microbiology








 Date/Time Procedure Status





Source Growth 


 


 5/22/17 10:30 Aerobic Blood Culture - Preliminary Resulted





Blood Other NO GROWTH IN 2 DAYS 





 5/22/17 10:30 Anaerobic Blood Culture - Preliminary Resulted





Blood Other NO GROWTH IN 2 DAYS 


 


 5/20/17 19:20 Urine Culture - Final Complete





Urine Catheterized Urine Escherichia Coli 





 Proteus Mirabilis 


 


 5/20/17 18:50 Aerobic Blood Culture - Final Resulted





Blood Peripheral Escherichia Coli 





 5/20/17 18:50 Anaerobic Blood Culture - Preliminary Resulted





Blood Peripheral NO GROWTH IN 4 DAYS 








Imaging





Last Impressions








Chest X-Ray 5/25/17 0600 Signed





Impressions: 





 Service Date/Time:  Thursday, May 25, 2017 03:53 - CONCLUSION:  Normal 





 examination status post left central line placement.       Roberth Hernández MD 


 


Nephrostomy 5/23/17 0000 Signed





Impressions: 





 Service Date/Time:  Tuesday, May 23, 2017 16:31 - CONCLUSION: Uncomplicated 





 nephrostomy tube placement as above.     Mike Watts MD 


 


Abdomen/Pelvis CT 5/23/17 0000 Signed





Impressions: 





 Service Date/Time:  Tuesday, May 23, 2017 10:26 - CONCLUSION:  1. There are 





 innumerable nonobstructing stones in the right renal collecting system 

measuring 





 up to 15 x 11 mm. At the right UPJ there is a 15 x 8 mm stone causing moderate 





 right hydronephrosis. 2. There are 5 nonobstructing stones in left kidney 





 measuring up to 8 mm. There is no left hydronephrosis. 3. Multiple urinary 





 bladder stones are present. 4. Small volume of free fluid is present in the 





 right paracolic gutter region. 5. Nonacute findings include atelectasis versus 





 consolidation the lung bases, severe atherosclerotic disease, and fat-

containing 





 right abdominal wall hernia.     Mike Wade MD 


 


Renal Ultrasound 5/22/17 0000 Signed





Impressions: 





 Service Date/Time:  Monday, May 22, 2017 08:28 - CONCLUSION:  Several left 





 kidney stones and mild hydronephrosis. Recommend stone protocol CT for further 





 evaluation.     Mike Watts MD 








Objective Remarks


GENERAL: 67-year-old male, resting in bed on nasal BiPAP


SKIN: Skin warm and dry no rash


HEAD: Normocephalic.


EYES: No scleral icterus. No injection or drainage. 


NECK: Supple, trachea midline. No JVD or lymphadenopathy.


CARDIOVASCULAR: Tachycardic rate and rhythm without murmurs, gallops, or rubs. 


RESPIRATORY: Breath sounds equal bilaterally.  Scattered rhonchi, no wheezing 

or crackles.  Slightly tachypneic/dyspneic


GASTROINTESTINAL: Abdomen soft, non-tender, nondistended.  Positive bowel 

sounds Colostomy in place.  Right-sided nephrostomy tube clean dry and intact


MUSCULOSKELETAL: Trace bilateral lower extremity edema


BACK: Nontender without obvious deformity.  Well-healed sacral decubitus ulcer


NEURO: Alert awake oriented.  Patient is quadriplegic C5 C7 incomplete. 4/5 

muscle strength upper extremity, 1/5 in Lowers, neurogenic bladder


Line:  Central Venous Catheter


Side:  Left


Location:  Internal, Jugular





A/P


Assessment and Plan


NEURO: 





Incomplete quadriplegia from cervical spine injury C5 through C7


Neurogenic bladder





-Minimize sedation, hold home meds (gabapentin 300 twice a day, desipramine 25 

daily and baclofen 10 mg twice a day)


-PT evaluation and treatment





RESP: 





Acute hypercapnic respiratory failure secondary to volume overload


History of COPD asthma


Obstructive sleep apnea/nasal CPAP





-Nasal cannula oxygen, if O2 sats dropped to consider Ventimask/BiPAP.


-Continue Symbicort 160/4.5 2 puffs twice a day


-DuoNeb every 6 hours while awake and every 4 hours when necessary


-Use home nasal CPAP at current settings





CV: 





Septic shock - resolving


Lactic acidemia 


Hypertension


Dyslipidemia





Will discontinue IV fluids today is hemodynamically stable .


Lactic acid is clear


-Holding home antihypertensives including losartan 100 mg daily and 

hydrochlorothiazide 25 mg daily.  


Continuing home lipid-lowering medication simvastatin 20 mg daily/noted on 

pravastatin 40 mill grams daily hospital substitution. 


continue aspirin 81 mg daily


Off all vasopressors





GI: 





Status post colostomy


Hypo-albuminemia


Hiatal hernia


Gastroesophageal reflux disease





-Heart healthy diet. PPI on Prilosec 20 daily at home.  Protonix substitution


-Colostomy management





: 





Acute kidney injury secondary to sepsis and dehydration 


Pyelonephritis


Multiple renal calculi


Neurogenic bladder





-Monitor renal function closely. Lira catheter. 


-IVF as above


-Renal ultrasound with multiple renal calculi.  Patient requests that his 

ultrasound results be sent to his urologist Dr. Ramírez.  


Nephrology - placed right-sided nephrostomy tube -950 past 24 hours


Continue Detrol 4 mg daily.


Holding oxybutynin 5 mill grams twice a day





ID: 





Septic shock


Escherichia coli bacteremia


UTI/ pyelonephritis in the setting of multiple renal calculi/neurogenic bladder





-Follow-up on blood urine cultures, sputum culture.


Urine cultures/blood culture from 5/20 growing Escherichia coli sensitive to 

Rocephin 


-Patient was initially on IV vancomycin and Levaquin and Zosyn.  Zosyn switched 

to meropenem on 5/22.  Patient evaluated by ID who discontinued all antibiotics 

and switched to IV Rocephin.


-Single dose of gentamicin given 5/21


-ID consult noted.  Antibiotics per ID. currently Rocephin and Levaquin.  Will 

likely switch to Levaquin at some point for 2 weeks total coverage





HEME: 





Leukocytosis


Normocytic anemia


Thrombocytopenia





-Monitor CBC, CMP, coags





ENDO: 





Type 2 diabetes


Hyperglycemia





-Sliding-scale insulin


-Electrolyte replacement per protocol





FEN:





Hypokalemia


Hypo-magnesium





2 g mag sulfate, 40 mEq potassium chloride.  Recheck this afternoon





PROPH: 





-Bilateral lower extremity SCDs. Lovenox 40 mg sq daily. PPI 





LINES:





-Left IJ central line (5/21)





Condition remains critical with septic shock secondary to Escherichia coli UTI/

bacteremia





CC time 45 min








Gal Quesada MD May 25, 2017 05:10

## 2017-05-25 NOTE — PD.TRANSFR
Transfer Summary


Admission Date


May 20, 2017 at 20:31


Transfer Date:  May 25, 2017


Admitting Diagnosis


severe sepsis; uti; renal insufficiency;copd; quadriplegia


Diagnoses:  


(1) Severe sepsis


Diagnosis:  Principal





(2) Lactic acidemia


Diagnosis:  Principal





(3) UTI (urinary tract infection)


Diagnosis:  Principal





(4) Hypotension


Diagnosis:  Principal





(5) Acute kidney failure


Diagnosis:  Principal





(6) Quadriplegia, C5-C7 incomplete


Diagnosis:  Principal





(7) Neurogenic bladder


Diagnosis:  Principal





(8) Obesity


Diagnosis:  Principal





(9) Depression


Diagnosis:  Principal





(10) Type 2 diabetes mellitus


Diagnosis:  Principal





Significant Findings


E Colace/Proteus UTI


Transfer Summary/Subjective


This is a 67-year-old male who was admitted with urosepsis.  Off vasopressors 

48 hours.  Stable to transfer to hospitalist service





Objective





 Vital Signs








  Date Time  Temp Pulse Resp B/P Pulse Ox O2 Delivery O2 Flow Rate FiO2


 


5/25/17 04:00  77      


 


5/25/17 04:00 99.3  19 113/67 95   


 


5/24/17 20:25        21


 


5/23/17 20:35      Nasal Cannula 2.00 








 Intake and Output








 5/24/17 5/24/17 5/25/17





 08:00 16:00 00:00


 


Intake Total 1014 ml 1172 ml 1212 ml


 


Output Total 3105 ml 1600 ml 2150 ml


 


Balance -2091 ml -428 ml -938 ml








Result Diagram:  


5/25/17 0446                                                                   

             5/25/17 0446





Imaging





Last Impressions








Chest X-Ray 5/25/17 0600 Signed





Impressions: 





 Service Date/Time:  Thursday, May 25, 2017 03:53 - CONCLUSION:  Normal 





 examination status post left central line placement.       Roberth Hernández MD 


 


Nephrostomy 5/23/17 0000 Signed





Impressions: 





 Service Date/Time:  Tuesday, May 23, 2017 16:31 - CONCLUSION: Uncomplicated 





 nephrostomy tube placement as above.     Mike Watts MD 


 


Abdomen/Pelvis CT 5/23/17 0000 Signed





Impressions: 





 Service Date/Time:  Tuesday, May 23, 2017 10:26 - CONCLUSION:  1. There are 





 innumerable nonobstructing stones in the right renal collecting system 

measuring 





 up to 15 x 11 mm. At the right UPJ there is a 15 x 8 mm stone causing moderate 





 right hydronephrosis. 2. There are 5 nonobstructing stones in left kidney 





 measuring up to 8 mm. There is no left hydronephrosis. 3. Multiple urinary 





 bladder stones are present. 4. Small volume of free fluid is present in the 





 right paracolic gutter region. 5. Nonacute findings include atelectasis versus 





 consolidation the lung bases, severe atherosclerotic disease, and fat-

containing 





 right abdominal wall hernia.     Mike Wade MD 


 


Renal Ultrasound 5/22/17 0000 Signed





Impressions: 





 Service Date/Time:  Monday, May 22, 2017 08:28 - CONCLUSION:  Several left 





 kidney stones and mild hydronephrosis. Recommend stone protocol CT for further 





 evaluation.     Mike Watts MD 








Objective Remarks


GENERAL: 67-year-old male, resting in bed on nasal BiPAP


SKIN: Skin warm and dry no rash


HEAD: Normocephalic.


EYES: No scleral icterus. No injection or drainage. 


NECK: Supple, trachea midline. No JVD or lymphadenopathy.


CARDIOVASCULAR: Tachycardic rate and rhythm without murmurs, gallops, or rubs. 


RESPIRATORY: Breath sounds equal bilaterally.  Scattered rhonchi, no wheezing 

or crackles.  Slightly tachypneic/dyspneic


GASTROINTESTINAL: Abdomen soft, non-tender, nondistended.  Positive bowel 

sounds Colostomy in place.  Right-sided nephrostomy tube clean dry and intact


MUSCULOSKELETAL: Trace bilateral lower extremity edema


BACK: Nontender without obvious deformity.  Well-healed sacral decubitus ulcer


NEURO: Alert awake oriented.  Patient is quadriplegic C5 C7 incomplete. 4/5 

muscle strength upper extremity, 1/5 in Lowers, neurogenic bladder


Line:  Central Venous Catheter


Side:  Left


Location:  Internal, Jugular





A/P


Assessment and Plan


NEURO: 





Incomplete quadriplegia from cervical spine injury C5 through C7


Neurogenic bladder





-Minimize sedation, hold home meds (gabapentin 300 twice a day, desipramine 25 

daily and baclofen 10 mg twice a day)


-PT evaluation and treatment





RESP: 





Acute hypercapnic respiratory failure secondary to volume overload


History of COPD asthma


Obstructive sleep apnea/nasal CPAP





-Nasal cannula oxygen, if O2 sats dropped to consider Ventimask/BiPAP.


-Continue Symbicort 160/4.5 2 puffs twice a day


-DuoNeb every 6 hours while awake and every 4 hours when necessary


-Use home nasal CPAP at current settings





CV: 





Septic shock - resolving


Lactic acidemia 


Hypertension


Dyslipidemia





Will discontinue IV fluids today is hemodynamically stable .


Lactic acid is clear


-Holding home antihypertensives including losartan 100 mg daily and 

hydrochlorothiazide 25 mg daily.  


Continuing home lipid-lowering medication simvastatin 20 mg daily/noted on 

pravastatin 40 mill grams daily hospital substitution. 


continue aspirin 81 mg daily


Off all vasopressors





GI: 





Status post colostomy


Hypo-albuminemia


Hiatal hernia


Gastroesophageal reflux disease





-Heart healthy diet. PPI on Prilosec 20 daily at home.  Protonix substitution


-Colostomy management





: 





Acute kidney injury secondary to sepsis and dehydration 


Pyelonephritis


Multiple renal calculi


Neurogenic bladder





-Monitor renal function closely. Lira catheter. 


-IVF as above


-Renal ultrasound with multiple renal calculi.  Patient requests that his 

ultrasound results be sent to his urologist Dr. Ramírez.  


Nephrology - placed right-sided nephrostomy tube -950 past 24 hours


Continue Detrol 4 mg daily.


Holding oxybutynin 5 mill grams twice a day





ID: 





Septic shock


Escherichia coli bacteremia


UTI/ pyelonephritis in the setting of multiple renal calculi/neurogenic bladder





-Follow-up on blood urine cultures, sputum culture.


Urine cultures/blood culture from 5/20 growing Escherichia coli sensitive to 

Rocephin 


-Patient was initially on IV vancomycin and Levaquin and Zosyn.  Zosyn switched 

to meropenem on 5/22.  Patient evaluated by ID who discontinued all antibiotics 

and switched to IV Rocephin.


-Single dose of gentamicin given 5/21


-ID consult noted.  Antibiotics per ID. currently Rocephin and Levaquin.  Will 

likely switch to Levaquin at some point for 2 weeks total coverage





HEME: 





Leukocytosis


Normocytic anemia


Thrombocytopenia





-Monitor CBC, CMP, coags





ENDO: 





Type 2 diabetes


Hyperglycemia





-Sliding-scale insulin


-Electrolyte replacement per protocol





FEN:





Hypokalemia


Hypo-magnesium





2 g mag sulfate, 40 mEq potassium chloride.  Recheck this afternoon





PROPH: 





-Bilateral lower extremity SCDs. Lovenox 40 mg sq daily. PPI 





LINES:





-Left IJ central line (5/21)





Condition remains critical with septic shock secondary to Escherichia coli UTI/

bacteremia





CC time 45 min








Gal Quesada MD May 25, 2017 05:27

## 2017-05-26 VITALS
OXYGEN SATURATION: 92 % | SYSTOLIC BLOOD PRESSURE: 133 MMHG | HEART RATE: 94 BPM | RESPIRATION RATE: 23 BRPM | DIASTOLIC BLOOD PRESSURE: 63 MMHG

## 2017-05-26 VITALS
DIASTOLIC BLOOD PRESSURE: 61 MMHG | RESPIRATION RATE: 20 BRPM | SYSTOLIC BLOOD PRESSURE: 129 MMHG | OXYGEN SATURATION: 93 % | HEART RATE: 72 BPM

## 2017-05-26 VITALS
TEMPERATURE: 98.8 F | DIASTOLIC BLOOD PRESSURE: 69 MMHG | HEART RATE: 80 BPM | SYSTOLIC BLOOD PRESSURE: 136 MMHG | RESPIRATION RATE: 22 BRPM | OXYGEN SATURATION: 95 %

## 2017-05-26 VITALS
HEART RATE: 70 BPM | SYSTOLIC BLOOD PRESSURE: 131 MMHG | OXYGEN SATURATION: 94 % | DIASTOLIC BLOOD PRESSURE: 58 MMHG | RESPIRATION RATE: 23 BRPM

## 2017-05-26 VITALS
RESPIRATION RATE: 24 BRPM | DIASTOLIC BLOOD PRESSURE: 62 MMHG | SYSTOLIC BLOOD PRESSURE: 120 MMHG | HEART RATE: 82 BPM | OXYGEN SATURATION: 95 %

## 2017-05-26 VITALS
HEART RATE: 68 BPM | SYSTOLIC BLOOD PRESSURE: 121 MMHG | OXYGEN SATURATION: 95 % | DIASTOLIC BLOOD PRESSURE: 64 MMHG | RESPIRATION RATE: 24 BRPM

## 2017-05-26 VITALS
HEART RATE: 84 BPM | SYSTOLIC BLOOD PRESSURE: 135 MMHG | RESPIRATION RATE: 28 BRPM | OXYGEN SATURATION: 96 % | DIASTOLIC BLOOD PRESSURE: 64 MMHG

## 2017-05-26 VITALS
SYSTOLIC BLOOD PRESSURE: 125 MMHG | OXYGEN SATURATION: 96 % | DIASTOLIC BLOOD PRESSURE: 60 MMHG | HEART RATE: 78 BPM | RESPIRATION RATE: 17 BRPM

## 2017-05-26 VITALS
HEART RATE: 78 BPM | OXYGEN SATURATION: 96 % | RESPIRATION RATE: 22 BRPM | SYSTOLIC BLOOD PRESSURE: 120 MMHG | DIASTOLIC BLOOD PRESSURE: 58 MMHG

## 2017-05-26 VITALS — HEART RATE: 80 BPM

## 2017-05-26 VITALS
SYSTOLIC BLOOD PRESSURE: 131 MMHG | DIASTOLIC BLOOD PRESSURE: 64 MMHG | HEART RATE: 78 BPM | OXYGEN SATURATION: 94 % | RESPIRATION RATE: 21 BRPM

## 2017-05-26 VITALS
SYSTOLIC BLOOD PRESSURE: 134 MMHG | OXYGEN SATURATION: 94 % | DIASTOLIC BLOOD PRESSURE: 63 MMHG | HEART RATE: 78 BPM | RESPIRATION RATE: 19 BRPM

## 2017-05-26 VITALS
SYSTOLIC BLOOD PRESSURE: 124 MMHG | TEMPERATURE: 99.5 F | OXYGEN SATURATION: 95 % | DIASTOLIC BLOOD PRESSURE: 58 MMHG | RESPIRATION RATE: 21 BRPM | HEART RATE: 76 BPM

## 2017-05-26 VITALS
DIASTOLIC BLOOD PRESSURE: 55 MMHG | OXYGEN SATURATION: 92 % | HEART RATE: 70 BPM | SYSTOLIC BLOOD PRESSURE: 131 MMHG | RESPIRATION RATE: 21 BRPM

## 2017-05-26 VITALS
RESPIRATION RATE: 24 BRPM | HEART RATE: 82 BPM | DIASTOLIC BLOOD PRESSURE: 61 MMHG | OXYGEN SATURATION: 95 % | SYSTOLIC BLOOD PRESSURE: 131 MMHG

## 2017-05-26 VITALS
SYSTOLIC BLOOD PRESSURE: 128 MMHG | DIASTOLIC BLOOD PRESSURE: 79 MMHG | RESPIRATION RATE: 22 BRPM | OXYGEN SATURATION: 94 % | HEART RATE: 70 BPM | TEMPERATURE: 98.7 F

## 2017-05-26 VITALS — OXYGEN SATURATION: 95 %

## 2017-05-26 VITALS
HEART RATE: 78 BPM | DIASTOLIC BLOOD PRESSURE: 51 MMHG | RESPIRATION RATE: 24 BRPM | OXYGEN SATURATION: 92 % | SYSTOLIC BLOOD PRESSURE: 119 MMHG

## 2017-05-26 VITALS
OXYGEN SATURATION: 94 % | SYSTOLIC BLOOD PRESSURE: 140 MMHG | HEART RATE: 76 BPM | DIASTOLIC BLOOD PRESSURE: 70 MMHG | RESPIRATION RATE: 26 BRPM

## 2017-05-26 VITALS
TEMPERATURE: 99 F | HEART RATE: 70 BPM | SYSTOLIC BLOOD PRESSURE: 125 MMHG | OXYGEN SATURATION: 92 % | DIASTOLIC BLOOD PRESSURE: 63 MMHG | RESPIRATION RATE: 18 BRPM

## 2017-05-26 VITALS
HEART RATE: 69 BPM | DIASTOLIC BLOOD PRESSURE: 54 MMHG | TEMPERATURE: 99 F | SYSTOLIC BLOOD PRESSURE: 112 MMHG | RESPIRATION RATE: 21 BRPM | OXYGEN SATURATION: 93 %

## 2017-05-26 VITALS
HEART RATE: 78 BPM | OXYGEN SATURATION: 95 % | RESPIRATION RATE: 18 BRPM | SYSTOLIC BLOOD PRESSURE: 133 MMHG | DIASTOLIC BLOOD PRESSURE: 59 MMHG

## 2017-05-26 VITALS
DIASTOLIC BLOOD PRESSURE: 67 MMHG | RESPIRATION RATE: 27 BRPM | OXYGEN SATURATION: 95 % | SYSTOLIC BLOOD PRESSURE: 129 MMHG | HEART RATE: 72 BPM

## 2017-05-26 VITALS
TEMPERATURE: 99.9 F | RESPIRATION RATE: 25 BRPM | SYSTOLIC BLOOD PRESSURE: 117 MMHG | DIASTOLIC BLOOD PRESSURE: 59 MMHG | OXYGEN SATURATION: 94 % | HEART RATE: 74 BPM

## 2017-05-26 VITALS
SYSTOLIC BLOOD PRESSURE: 126 MMHG | OXYGEN SATURATION: 95 % | RESPIRATION RATE: 25 BRPM | HEART RATE: 78 BPM | DIASTOLIC BLOOD PRESSURE: 66 MMHG

## 2017-05-26 VITALS
SYSTOLIC BLOOD PRESSURE: 128 MMHG | HEART RATE: 68 BPM | DIASTOLIC BLOOD PRESSURE: 65 MMHG | RESPIRATION RATE: 26 BRPM | OXYGEN SATURATION: 97 %

## 2017-05-26 VITALS — HEART RATE: 82 BPM

## 2017-05-26 VITALS — HEART RATE: 70 BPM

## 2017-05-26 LAB
ANION GAP SERPL CALC-SCNC: 8 MEQ/L (ref 5–15)
BASOPHILS # BLD AUTO: 0 TH/MM3 (ref 0–0.2)
BASOPHILS NFR BLD: 0.2 % (ref 0–2)
BUN SERPL-MCNC: 19 MG/DL (ref 7–18)
CHLORIDE SERPL-SCNC: 104 MEQ/L (ref 98–107)
EOSINOPHIL # BLD: 0.4 TH/MM3 (ref 0–0.4)
EOSINOPHIL NFR BLD: 2.2 % (ref 0–4)
ERYTHROCYTE [DISTWIDTH] IN BLOOD BY AUTOMATED COUNT: 17.4 % (ref 11.6–17.2)
GFR SERPLBLD BASED ON 1.73 SQ M-ARVRAT: 101 ML/MIN (ref 89–?)
HCO3 BLD-SCNC: 27.3 MEQ/L (ref 21–32)
HCT VFR BLD CALC: 28.9 % (ref 39–51)
HEMO FLAGS: (no result)
LYMPHOCYTES # BLD AUTO: 1.6 TH/MM3 (ref 1–4.8)
LYMPHOCYTES NFR BLD AUTO: 8.5 % (ref 9–44)
MAGNESIUM SERPL-MCNC: 1.7 MG/DL (ref 1.5–2.5)
MAGNESIUM SERPL-MCNC: 1.8 MG/DL (ref 1.5–2.5)
MCH RBC QN AUTO: 26.3 PG (ref 27–34)
MCHC RBC AUTO-ENTMCNC: 32.5 % (ref 32–36)
MCV RBC AUTO: 81 FL (ref 80–100)
MONOCYTES NFR BLD: 4.9 % (ref 0–8)
NEUTROPHILS # BLD AUTO: 16 TH/MM3 (ref 1.8–7.7)
NEUTROPHILS NFR BLD AUTO: 84.2 % (ref 16–70)
PLATELET # BLD: 189 TH/MM3 (ref 150–450)
POTASSIUM SERPL-SCNC: 3.5 MEQ/L (ref 3.5–5.1)
POTASSIUM SERPL-SCNC: 3.6 MEQ/L (ref 3.5–5.1)
RBC # BLD AUTO: 3.57 MIL/MM3 (ref 4.5–5.9)
SODIUM SERPL-SCNC: 139 MEQ/L (ref 136–145)
WBC # BLD AUTO: 18.9 TH/MM3 (ref 4–11)

## 2017-05-26 RX ADMIN — Medication SCH ML: at 21:12

## 2017-05-26 RX ADMIN — IPRATROPIUM BROMIDE AND ALBUTEROL SULFATE SCH AMPULE: .5; 3 SOLUTION RESPIRATORY (INHALATION) at 09:40

## 2017-05-26 RX ADMIN — TOLTERODINE TARTRATE SCH MG: 4 CAPSULE, EXTENDED RELEASE ORAL at 09:09

## 2017-05-26 RX ADMIN — PANTOPRAZOLE SODIUM SCH MG: 20 TABLET, DELAYED RELEASE ORAL at 09:09

## 2017-05-26 RX ADMIN — Medication SCH ML: at 09:00

## 2017-05-26 RX ADMIN — IPRATROPIUM BROMIDE AND ALBUTEROL SULFATE SCH AMPULE: .5; 3 SOLUTION RESPIRATORY (INHALATION) at 15:02

## 2017-05-26 RX ADMIN — INSULIN ASPART SCH: 100 INJECTION, SOLUTION INTRAVENOUS; SUBCUTANEOUS at 17:48

## 2017-05-26 RX ADMIN — ENOXAPARIN SODIUM SCH MG: 40 INJECTION SUBCUTANEOUS at 09:09

## 2017-05-26 RX ADMIN — PROMETHAZINE HYDROCHLORIDE PRN MG: 25 TABLET ORAL at 02:42

## 2017-05-26 RX ADMIN — INSULIN ASPART SCH: 100 INJECTION, SOLUTION INTRAVENOUS; SUBCUTANEOUS at 12:18

## 2017-05-26 RX ADMIN — OXYCODONE HYDROCHLORIDE AND ACETAMINOPHEN SCH MG: 500 TABLET ORAL at 21:13

## 2017-05-26 RX ADMIN — CEFTRIAXONE SODIUM SCH MLS/HR: 2 INJECTION, POWDER, FOR SOLUTION INTRAMUSCULAR; INTRAVENOUS at 17:40

## 2017-05-26 RX ADMIN — PHENYTOIN SODIUM SCH MLS/HR: 50 INJECTION INTRAMUSCULAR; INTRAVENOUS at 21:13

## 2017-05-26 RX ADMIN — PRAVASTATIN SODIUM SCH MG: 40 TABLET ORAL at 21:13

## 2017-05-26 RX ADMIN — CHLORHEXIDINE GLUCONATE SCH PACK: 500 CLOTH TOPICAL at 21:14

## 2017-05-26 RX ADMIN — INSULIN ASPART SCH: 100 INJECTION, SOLUTION INTRAVENOUS; SUBCUTANEOUS at 06:30

## 2017-05-26 RX ADMIN — IPRATROPIUM BROMIDE AND ALBUTEROL SULFATE SCH AMPULE: .5; 3 SOLUTION RESPIRATORY (INHALATION) at 19:23

## 2017-05-26 RX ADMIN — CHLORHEXIDINE GLUCONATE SCH PACK: 500 CLOTH TOPICAL at 04:00

## 2017-05-26 RX ADMIN — BUDESONIDE AND FORMOTEROL FUMARATE DIHYDRATE SCH PUFF: 160; 4.5 AEROSOL RESPIRATORY (INHALATION) at 21:12

## 2017-05-26 RX ADMIN — FLUCONAZOLE SCH MG: 100 TABLET ORAL at 21:13

## 2017-05-26 RX ADMIN — ASPIRIN SCH MG: 81 TABLET ORAL at 09:09

## 2017-05-26 RX ADMIN — INSULIN ASPART SCH: 100 INJECTION, SOLUTION INTRAVENOUS; SUBCUTANEOUS at 21:00

## 2017-05-26 RX ADMIN — OXYCODONE HYDROCHLORIDE AND ACETAMINOPHEN SCH MG: 500 TABLET ORAL at 09:09

## 2017-05-26 RX ADMIN — PROMETHAZINE HYDROCHLORIDE PRN MG: 25 TABLET ORAL at 21:21

## 2017-05-26 RX ADMIN — SODIUM CHLORIDE SCH MLS/HR: 900 INJECTION INTRAVENOUS at 07:10
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
## 2017-05-26 NOTE — HHI.IDPN
Subjective


Subjective


Remarks


doing better, but WBC up again


co sore throat, tongue


minimal  low grade fevers


feels better


Antibiotics


CFTX


Past Medical History


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


Neurogenic bladder


Type 2 diabetes


Hypertension


Dyslipidemia


Obstructive sleep apnea


Asthma COPD


Kidney stones


Past Surgical History


Cervical spine fusion in 2007


Colostomy in 2007


Allergies:  


Coded Allergies:  


     Morphine (Verified  Allergy, Severe, 5/20/17)


 HALLUCINATIONS


     Sulfa (Verified  Allergy, Severe, 5/20/17)


 SKIN RASH


     *MDRO Multi-Drug Resistant Organism (Verified  Allergy, Unknown, 5/22/17)


 MRSA PCR Positive 5/20/17


 MRSA 2008


 Enterococcus faecium VRE 2007


 Carbapenem-resistant Acinetobacter baumannii 2007





Objective


.





 Vital Signs








  Date Time  Temp Pulse Resp B/P Pulse Ox O2 Delivery O2 Flow Rate FiO2


 


5/26/17 19:23     95   


 


5/26/17 18:00  78 17 125/60 96   


 


5/26/17 18:00  78      


 


5/26/17 17:00  82 24 120/62 95   


 


5/26/17 16:00 98.7 86 22 128/79 94   


 


5/26/17 16:00  70      


 


5/26/17 15:00  68 24 121/64 95   


 


5/26/17 14:00  76 26 140/70 94   


 


5/26/17 14:00  76      


 


5/26/17 13:00  82 24 131/61 95   


 


5/26/17 12:00  80      


 


5/26/17 12:00 98.8 80 22 136/69 95   


 


5/26/17 11:00  78 18 133/59 95   


 


5/26/17 10:00  78      


 


5/26/17 10:00  78 21 131/64 94   


 


5/26/17 09:43     95   21


 


5/26/17 09:00  70 21 131/55 92   


 


5/26/17 08:00 99.0 70 18 125/63 92   


 


5/26/17 08:00  70      


 


5/26/17 07:00  68 26 128/65 97   


 


5/26/17 06:01  78 25 126/66 95   


 


5/26/17 06:00  70      


 


5/26/17 05:01  72 20 129/61 93   


 


5/26/17 04:00 99.0 72 21 112/54 93   


 


5/26/17 04:00  69      


 


5/26/17 03:00  84 28 135/64 96   


 


5/26/17 02:00  70 23 131/58 94   


 


5/26/17 02:00  77      


 


5/26/17 01:00  78 22 120/58 96   


 


5/26/17 00:00  74      


 


5/26/17 00:00 99.9 72 25 117/59 94   


 


5/25/17 23:00  72 23 104/59 95   


 


5/25/17 22:00  74 22 121/59 93   


 


5/25/17 22:00  72      


 


5/25/17 21:00  78 16 112/58 94   














 5/25/17 5/25/17 5/26/17





 15:00 23:00 07:00


 


Intake Total  1653 ml 760 ml


 


Output Total  2225 ml 1000 ml


 


Balance  -572 ml -240 ml


 


   


 


Intake Oral  620 ml 360 ml


 


IV Total  1033 ml 400 ml


 


Output Urine Total  1300 ml 500 ml


 


Stool Total   150 ml


 


Drainage Total  925 ml 350 ml


 


# Bowel Movements  0 








.





Laboratory Tests








Test 5/25/17 5/26/17





 04:46 04:06


 


White Blood Count 16.5 TH/MM3 18.9 TH/MM3


 


Red Blood Count 3.78 MIL/MM3 3.57 MIL/MM3


 


Hemoglobin 10.0 GM/DL 9.4 GM/DL


 


Hematocrit 30.3 % 28.9 %


 


Mean Corpuscular Volume 80.2 FL 81.0 FL


 


Mean Corpuscular Hemoglobin 26.4 PG 26.3 PG


 


Mean Corpuscular Hemoglobin 33.0 % 32.5 %





Concent  


 


Red Cell Distribution Width 17.0 % 17.4 %


 


Platelet Count 148 TH/MM3 189 TH/MM3


 


Mean Platelet Volume 9.4 FL 9.4 FL


 


Neutrophils (%) (Auto) 87.6 % 84.2 %


 


Lymphocytes (%) (Auto) 7.9 % 8.5 %


 


Monocytes (%) (Auto) 3.0 % 4.9 %


 


Eosinophils (%) (Auto) 1.4 % 2.2 %


 


Basophils (%) (Auto) 0.1 % 0.2 %


 


Neutrophils # (Auto) 14.5 TH/MM3 16.0 TH/MM3


 


Lymphocytes # (Auto) 1.3 TH/MM3 1.6 TH/MM3


 


Monocytes # (Auto) 0.5 TH/MM3 0.9 TH/MM3


 


Eosinophils # (Auto) 0.2 TH/MM3 0.4 TH/MM3


 


Basophils # (Auto) 0.0 TH/MM3 0.0 TH/MM3


 


CBC Comment DIFF FINAL  DIFF FINAL 


 


Differential Comment    








Laboratory Tests








Test 5/25/17 5/25/17 5/26/17





 04:46 15:55 04:06


 


Sodium Level 139 MEQ/L  139 MEQ/L


 


Potassium Level 3.4 MEQ/L 3.5 MEQ/L 3.5 MEQ/L


 


Chloride Level 103 MEQ/L  104 MEQ/L


 


Carbon Dioxide Level 27.7 MEQ/L  27.3 MEQ/L


 


Anion Gap 8 MEQ/L  8 MEQ/L


 


Blood Urea Nitrogen 26 MG/DL  19 MG/DL


 


Creatinine 0.98 MG/DL  0.77 MG/DL


 


Estimat Glomerular Filtration 77 ML/MIN  101 ML/MIN





Rate   


 


Random Glucose 211 MG/DL  167 MG/DL


 


Lactic Acid Level 2.0 mmol/L  


 


Calcium Level 8.0 MG/DL  7.9 MG/DL


 


Phosphorus Level 1.5 MG/DL  2.1 MG/DL


 


Magnesium Level 1.6 MG/DL  1.8 MG/DL


 


Total Bilirubin 0.4 MG/DL  


 


Aspartate Amino Transf 18 U/L  





(AST/SGOT)   


 


Alanine Aminotransferase 23 U/L  





(ALT/SGPT)   


 


Alkaline Phosphatase 73 U/L  


 


Total Protein 6.0 GM/DL  


 


Albumin 2.1 GM/DL  








Microbiology








 Date/Time Procedure Status





Source Growth 


 


 5/26/17 15:20 Throat Culture Received





Throat Pending 








Imaging





Last Impressions








Chest X-Ray 5/25/17 0600 Signed





Impressions: 





 Service Date/Time:  Thursday, May 25, 2017 03:53 - CONCLUSION:  Normal 





 examination status post left central line placement.       Roberth Hernández MD 


 


Nephrostomy 5/23/17 0000 Signed





Impressions: 





 Service Date/Time:  Tuesday, May 23, 2017 16:31 - CONCLUSION: Uncomplicated 





 nephrostomy tube placement as above.     Mike Watts MD 


 


Abdomen/Pelvis CT 5/23/17 0000 Signed





Impressions: 





 Service Date/Time:  Tuesday, May 23, 2017 10:26 - CONCLUSION:  1. There are 





 innumerable nonobstructing stones in the right renal collecting system 

measuring 





 up to 15 x 11 mm. At the right UPJ there is a 15 x 8 mm stone causing moderate 





 right hydronephrosis. 2. There are 5 nonobstructing stones in left kidney 





 measuring up to 8 mm. There is no left hydronephrosis. 3. Multiple urinary 





 bladder stones are present. 4. Small volume of free fluid is present in the 





 right paracolic gutter region. 5. Nonacute findings include atelectasis versus 





 consolidation the lung bases, severe atherosclerotic disease, and fat-

containing 





 right abdominal wall hernia.     Mike Wade MD 


 


Renal Ultrasound 5/22/17 0000 Signed





Impressions: 





 Service Date/Time:  Monday, May 22, 2017 08:28 - CONCLUSION:  Several left 





 kidney stones and mild hydronephrosis. Recommend stone protocol CT for further 





 evaluation.     Mike Watts MD 








Physical Exam


GENERAL: This is a chronically ill, patient with quadriparesis, in no apparent 

distress.


LINES: L SCV TLVC in place with no skin changes


SKIN: No rashes, ecchymoses or lesions. Cool and dry.


HEENT: oral mucosae  with whitish patches cw oral thrush


NECK: Trachea midline.


CARDIOVASCULAR: Regular rate and rhythm without murmurs, gallops, or rubs. 


RESPIRATORY: On BIPAP


Clear to auscultation. Breath sounds equal bilaterally. No wheezes, rales, or 

rhonchi.  


GASTROINTESTINAL: Abdomen soft, Obese non-tender, nondistended.


MUSCULOSKELETAL: Extremities without  cyanosis, 


Prominent B/L edema 3+


: hughes in place with clear yellow  urine


R nephrostomy in place with clear yellow urine


NEUROLOGICAL: awake; alert, normla speech; fairly strong BUE exceppt fine 

finger movement s


BLE complatrely plegic





Assessment & Plan


Remarks


 Severe sepsis


   - clincially improved 


UTI, E.coli, Proteus with secondary bacteremia  2/2 E.coli


   -complicated UTI , in the settings of R sided  obstructive uropahty 2/2 

kidney stone


   - sp R nephrostomy


   - both E.coli and Proteu are S to levaquine


Chronic hughes 2/2 neurogenic bladder


Imcomplete quadriplegia


ARF - resolving


Leukocytosis - persistent


Lactic acidosis - resolved


Persistent low grade fever - improved


Oral thrush related to abx use








Plan: cont  cont IV Ceftriaxone 2 g daily


   - anticipate transition to Levaquine IV/PO if remains clincally stable and 

sepsis physiology


   - anticiapate 2 weeks of total abx course





   - monitor WBC and fever 


   - discharge when  WBC goes down and afebrile


   - fluconazole  for thrush





dw Alpa Cohen MD May 26, 2017 20:17

## 2017-05-27 VITALS — HEART RATE: 82 BPM

## 2017-05-27 VITALS
HEART RATE: 70 BPM | DIASTOLIC BLOOD PRESSURE: 75 MMHG | SYSTOLIC BLOOD PRESSURE: 120 MMHG | RESPIRATION RATE: 25 BRPM | TEMPERATURE: 98.8 F

## 2017-05-27 VITALS
RESPIRATION RATE: 21 BRPM | OXYGEN SATURATION: 95 % | DIASTOLIC BLOOD PRESSURE: 55 MMHG | HEART RATE: 68 BPM | SYSTOLIC BLOOD PRESSURE: 120 MMHG

## 2017-05-27 VITALS
OXYGEN SATURATION: 95 % | DIASTOLIC BLOOD PRESSURE: 54 MMHG | RESPIRATION RATE: 26 BRPM | SYSTOLIC BLOOD PRESSURE: 113 MMHG | HEART RATE: 80 BPM

## 2017-05-27 VITALS
OXYGEN SATURATION: 94 % | RESPIRATION RATE: 22 BRPM | HEART RATE: 74 BPM | TEMPERATURE: 99.5 F | DIASTOLIC BLOOD PRESSURE: 59 MMHG | SYSTOLIC BLOOD PRESSURE: 136 MMHG

## 2017-05-27 VITALS
DIASTOLIC BLOOD PRESSURE: 60 MMHG | TEMPERATURE: 98.8 F | RESPIRATION RATE: 23 BRPM | HEART RATE: 70 BPM | OXYGEN SATURATION: 93 % | SYSTOLIC BLOOD PRESSURE: 133 MMHG

## 2017-05-27 VITALS
HEART RATE: 82 BPM | OXYGEN SATURATION: 93 % | DIASTOLIC BLOOD PRESSURE: 59 MMHG | RESPIRATION RATE: 24 BRPM | SYSTOLIC BLOOD PRESSURE: 110 MMHG

## 2017-05-27 VITALS — DIASTOLIC BLOOD PRESSURE: 72 MMHG | HEART RATE: 86 BPM | RESPIRATION RATE: 23 BRPM | SYSTOLIC BLOOD PRESSURE: 145 MMHG

## 2017-05-27 VITALS
HEART RATE: 80 BPM | RESPIRATION RATE: 21 BRPM | OXYGEN SATURATION: 94 % | DIASTOLIC BLOOD PRESSURE: 71 MMHG | SYSTOLIC BLOOD PRESSURE: 151 MMHG

## 2017-05-27 VITALS
RESPIRATION RATE: 22 BRPM | HEART RATE: 74 BPM | OXYGEN SATURATION: 95 % | DIASTOLIC BLOOD PRESSURE: 58 MMHG | SYSTOLIC BLOOD PRESSURE: 133 MMHG

## 2017-05-27 VITALS
OXYGEN SATURATION: 93 % | HEART RATE: 80 BPM | DIASTOLIC BLOOD PRESSURE: 57 MMHG | RESPIRATION RATE: 12 BRPM | SYSTOLIC BLOOD PRESSURE: 123 MMHG

## 2017-05-27 VITALS — HEART RATE: 74 BPM

## 2017-05-27 VITALS
DIASTOLIC BLOOD PRESSURE: 49 MMHG | TEMPERATURE: 100 F | SYSTOLIC BLOOD PRESSURE: 123 MMHG | OXYGEN SATURATION: 93 % | RESPIRATION RATE: 23 BRPM | HEART RATE: 80 BPM

## 2017-05-27 VITALS
SYSTOLIC BLOOD PRESSURE: 112 MMHG | OXYGEN SATURATION: 94 % | DIASTOLIC BLOOD PRESSURE: 53 MMHG | RESPIRATION RATE: 27 BRPM | HEART RATE: 84 BPM

## 2017-05-27 VITALS
SYSTOLIC BLOOD PRESSURE: 145 MMHG | OXYGEN SATURATION: 95 % | HEART RATE: 72 BPM | DIASTOLIC BLOOD PRESSURE: 68 MMHG | RESPIRATION RATE: 24 BRPM

## 2017-05-27 VITALS
RESPIRATION RATE: 25 BRPM | OXYGEN SATURATION: 93 % | SYSTOLIC BLOOD PRESSURE: 121 MMHG | DIASTOLIC BLOOD PRESSURE: 53 MMHG | HEART RATE: 84 BPM

## 2017-05-27 VITALS — HEART RATE: 86 BPM

## 2017-05-27 VITALS — DIASTOLIC BLOOD PRESSURE: 64 MMHG | HEART RATE: 74 BPM | SYSTOLIC BLOOD PRESSURE: 131 MMHG | RESPIRATION RATE: 21 BRPM

## 2017-05-27 VITALS — HEART RATE: 80 BPM

## 2017-05-27 VITALS — HEART RATE: 77 BPM

## 2017-05-27 VITALS — HEART RATE: 82 BPM | DIASTOLIC BLOOD PRESSURE: 44 MMHG | SYSTOLIC BLOOD PRESSURE: 91 MMHG | RESPIRATION RATE: 24 BRPM

## 2017-05-27 VITALS
RESPIRATION RATE: 25 BRPM | HEART RATE: 80 BPM | SYSTOLIC BLOOD PRESSURE: 134 MMHG | OXYGEN SATURATION: 93 % | DIASTOLIC BLOOD PRESSURE: 62 MMHG

## 2017-05-27 VITALS
DIASTOLIC BLOOD PRESSURE: 46 MMHG | OXYGEN SATURATION: 92 % | RESPIRATION RATE: 20 BRPM | TEMPERATURE: 99.1 F | SYSTOLIC BLOOD PRESSURE: 107 MMHG | HEART RATE: 82 BPM

## 2017-05-27 VITALS
HEART RATE: 72 BPM | DIASTOLIC BLOOD PRESSURE: 62 MMHG | RESPIRATION RATE: 23 BRPM | OXYGEN SATURATION: 94 % | SYSTOLIC BLOOD PRESSURE: 135 MMHG

## 2017-05-27 VITALS — SYSTOLIC BLOOD PRESSURE: 89 MMHG | RESPIRATION RATE: 18 BRPM | HEART RATE: 86 BPM | DIASTOLIC BLOOD PRESSURE: 45 MMHG

## 2017-05-27 VITALS
SYSTOLIC BLOOD PRESSURE: 113 MMHG | DIASTOLIC BLOOD PRESSURE: 49 MMHG | HEART RATE: 82 BPM | RESPIRATION RATE: 21 BRPM | OXYGEN SATURATION: 94 %

## 2017-05-27 VITALS
RESPIRATION RATE: 26 BRPM | HEART RATE: 76 BPM | DIASTOLIC BLOOD PRESSURE: 62 MMHG | SYSTOLIC BLOOD PRESSURE: 138 MMHG | OXYGEN SATURATION: 94 %

## 2017-05-27 VITALS
DIASTOLIC BLOOD PRESSURE: 58 MMHG | RESPIRATION RATE: 24 BRPM | OXYGEN SATURATION: 94 % | SYSTOLIC BLOOD PRESSURE: 114 MMHG | HEART RATE: 76 BPM | TEMPERATURE: 99.6 F

## 2017-05-27 VITALS
DIASTOLIC BLOOD PRESSURE: 76 MMHG | OXYGEN SATURATION: 94 % | RESPIRATION RATE: 25 BRPM | SYSTOLIC BLOOD PRESSURE: 132 MMHG | HEART RATE: 86 BPM

## 2017-05-27 VITALS — OXYGEN SATURATION: 95 %

## 2017-05-27 VITALS — HEART RATE: 79 BPM

## 2017-05-27 VITALS
HEART RATE: 76 BPM | SYSTOLIC BLOOD PRESSURE: 132 MMHG | DIASTOLIC BLOOD PRESSURE: 62 MMHG | OXYGEN SATURATION: 96 % | RESPIRATION RATE: 26 BRPM

## 2017-05-27 VITALS — HEART RATE: 72 BPM

## 2017-05-27 VITALS — OXYGEN SATURATION: 94 %

## 2017-05-27 LAB
ALP SERPL-CCNC: 70 U/L (ref 45–117)
ALT SERPL-CCNC: 17 U/L (ref 12–78)
ANION GAP SERPL CALC-SCNC: 7 MEQ/L (ref 5–15)
AST SERPL-CCNC: 11 U/L (ref 15–37)
BASOPHILS # BLD AUTO: 0.1 TH/MM3 (ref 0–0.2)
BASOPHILS NFR BLD: 0.5 % (ref 0–2)
BILIRUB SERPL-MCNC: 0.5 MG/DL (ref 0.2–1)
BUN SERPL-MCNC: 16 MG/DL (ref 7–18)
CHLORIDE SERPL-SCNC: 102 MEQ/L (ref 98–107)
EOSINOPHIL # BLD: 0.4 TH/MM3 (ref 0–0.4)
EOSINOPHIL NFR BLD: 2.8 % (ref 0–4)
ERYTHROCYTE [DISTWIDTH] IN BLOOD BY AUTOMATED COUNT: 17.3 % (ref 11.6–17.2)
GFR SERPLBLD BASED ON 1.73 SQ M-ARVRAT: 89 ML/MIN (ref 89–?)
HCO3 BLD-SCNC: 27.7 MEQ/L (ref 21–32)
HCT VFR BLD CALC: 28.5 % (ref 39–51)
HEMO FLAGS: (no result)
LYMPHOCYTES # BLD AUTO: 1.6 TH/MM3 (ref 1–4.8)
LYMPHOCYTES NFR BLD AUTO: 10.4 % (ref 9–44)
MCH RBC QN AUTO: 27 PG (ref 27–34)
MCHC RBC AUTO-ENTMCNC: 33.9 % (ref 32–36)
MCV RBC AUTO: 79.6 FL (ref 80–100)
MONOCYTES NFR BLD: 3.1 % (ref 0–8)
NEUTROPHILS # BLD AUTO: 12.9 TH/MM3 (ref 1.8–7.7)
NEUTROPHILS NFR BLD AUTO: 83.2 % (ref 16–70)
PLATELET # BLD: 245 TH/MM3 (ref 150–450)
POTASSIUM SERPL-SCNC: 3.6 MEQ/L (ref 3.5–5.1)
RBC # BLD AUTO: 3.58 MIL/MM3 (ref 4.5–5.9)
SODIUM SERPL-SCNC: 137 MEQ/L (ref 136–145)
WBC # BLD AUTO: 15.5 TH/MM3 (ref 4–11)

## 2017-05-27 RX ADMIN — PROMETHAZINE HYDROCHLORIDE PRN MG: 25 TABLET ORAL at 22:02

## 2017-05-27 RX ADMIN — OXYCODONE HYDROCHLORIDE AND ACETAMINOPHEN SCH MG: 500 TABLET ORAL at 08:37

## 2017-05-27 RX ADMIN — BUDESONIDE AND FORMOTEROL FUMARATE DIHYDRATE SCH PUFF: 160; 4.5 AEROSOL RESPIRATORY (INHALATION) at 20:15

## 2017-05-27 RX ADMIN — TOLTERODINE TARTRATE SCH MG: 4 CAPSULE, EXTENDED RELEASE ORAL at 08:37

## 2017-05-27 RX ADMIN — INSULIN ASPART SCH: 100 INJECTION, SOLUTION INTRAVENOUS; SUBCUTANEOUS at 13:13

## 2017-05-27 RX ADMIN — PROMETHAZINE HYDROCHLORIDE PRN MG: 25 TABLET ORAL at 00:46

## 2017-05-27 RX ADMIN — SODIUM CHLORIDE SCH MLS/HR: 900 INJECTION INTRAVENOUS at 05:24

## 2017-05-27 RX ADMIN — CEFTRIAXONE SODIUM SCH MLS/HR: 2 INJECTION, POWDER, FOR SOLUTION INTRAMUSCULAR; INTRAVENOUS at 17:52

## 2017-05-27 RX ADMIN — BUDESONIDE AND FORMOTEROL FUMARATE DIHYDRATE SCH PUFF: 160; 4.5 AEROSOL RESPIRATORY (INHALATION) at 08:38

## 2017-05-27 RX ADMIN — INSULIN ASPART SCH: 100 INJECTION, SOLUTION INTRAVENOUS; SUBCUTANEOUS at 17:56

## 2017-05-27 RX ADMIN — PHENYTOIN SODIUM SCH MLS/HR: 50 INJECTION INTRAMUSCULAR; INTRAVENOUS at 17:45

## 2017-05-27 RX ADMIN — Medication SCH ML: at 20:12

## 2017-05-27 RX ADMIN — PRAVASTATIN SODIUM SCH MG: 40 TABLET ORAL at 20:12

## 2017-05-27 RX ADMIN — IPRATROPIUM BROMIDE AND ALBUTEROL SULFATE SCH AMPULE: .5; 3 SOLUTION RESPIRATORY (INHALATION) at 07:41

## 2017-05-27 RX ADMIN — Medication SCH ML: at 08:38

## 2017-05-27 RX ADMIN — CHLORHEXIDINE GLUCONATE SCH PACK: 500 CLOTH TOPICAL at 22:01

## 2017-05-27 RX ADMIN — ASPIRIN SCH MG: 81 TABLET ORAL at 08:37

## 2017-05-27 RX ADMIN — IPRATROPIUM BROMIDE AND ALBUTEROL SULFATE SCH AMPULE: .5; 3 SOLUTION RESPIRATORY (INHALATION) at 20:06

## 2017-05-27 RX ADMIN — PANTOPRAZOLE SODIUM SCH MG: 20 TABLET, DELAYED RELEASE ORAL at 08:37

## 2017-05-27 RX ADMIN — CHLORHEXIDINE GLUCONATE SCH PACK: 500 CLOTH TOPICAL at 20:15

## 2017-05-27 RX ADMIN — FLUCONAZOLE SCH MG: 100 TABLET ORAL at 08:37

## 2017-05-27 RX ADMIN — OXYCODONE HYDROCHLORIDE AND ACETAMINOPHEN SCH MG: 500 TABLET ORAL at 20:12

## 2017-05-27 RX ADMIN — NYSTATIN SCH APPLIC: 100000 POWDER TOPICAL at 22:00

## 2017-05-27 RX ADMIN — IPRATROPIUM BROMIDE AND ALBUTEROL SULFATE SCH AMPULE: .5; 3 SOLUTION RESPIRATORY (INHALATION) at 14:16

## 2017-05-27 RX ADMIN — INSULIN ASPART SCH: 100 INJECTION, SOLUTION INTRAVENOUS; SUBCUTANEOUS at 06:51

## 2017-05-27 RX ADMIN — ENOXAPARIN SODIUM SCH MG: 40 INJECTION SUBCUTANEOUS at 08:37

## 2017-05-27 RX ADMIN — INSULIN ASPART SCH: 100 INJECTION, SOLUTION INTRAVENOUS; SUBCUTANEOUS at 21:59
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
## 2017-05-28 VITALS
SYSTOLIC BLOOD PRESSURE: 141 MMHG | DIASTOLIC BLOOD PRESSURE: 68 MMHG | TEMPERATURE: 99.1 F | RESPIRATION RATE: 19 BRPM | HEART RATE: 72 BPM | OXYGEN SATURATION: 95 %

## 2017-05-28 VITALS — HEART RATE: 90 BPM

## 2017-05-28 VITALS
HEART RATE: 68 BPM | RESPIRATION RATE: 18 BRPM | OXYGEN SATURATION: 92 % | SYSTOLIC BLOOD PRESSURE: 156 MMHG | DIASTOLIC BLOOD PRESSURE: 74 MMHG

## 2017-05-28 VITALS
DIASTOLIC BLOOD PRESSURE: 59 MMHG | OXYGEN SATURATION: 97 % | HEART RATE: 74 BPM | RESPIRATION RATE: 19 BRPM | SYSTOLIC BLOOD PRESSURE: 139 MMHG

## 2017-05-28 VITALS
HEART RATE: 70 BPM | TEMPERATURE: 98.8 F | OXYGEN SATURATION: 94 % | SYSTOLIC BLOOD PRESSURE: 138 MMHG | DIASTOLIC BLOOD PRESSURE: 89 MMHG | RESPIRATION RATE: 21 BRPM

## 2017-05-28 VITALS
RESPIRATION RATE: 21 BRPM | DIASTOLIC BLOOD PRESSURE: 76 MMHG | OXYGEN SATURATION: 95 % | SYSTOLIC BLOOD PRESSURE: 150 MMHG | HEART RATE: 74 BPM

## 2017-05-28 VITALS
OXYGEN SATURATION: 94 % | DIASTOLIC BLOOD PRESSURE: 64 MMHG | RESPIRATION RATE: 24 BRPM | HEART RATE: 80 BPM | SYSTOLIC BLOOD PRESSURE: 138 MMHG

## 2017-05-28 VITALS
RESPIRATION RATE: 28 BRPM | DIASTOLIC BLOOD PRESSURE: 68 MMHG | SYSTOLIC BLOOD PRESSURE: 128 MMHG | HEART RATE: 82 BPM | OXYGEN SATURATION: 90 %

## 2017-05-28 VITALS
OXYGEN SATURATION: 91 % | DIASTOLIC BLOOD PRESSURE: 62 MMHG | RESPIRATION RATE: 22 BRPM | SYSTOLIC BLOOD PRESSURE: 133 MMHG | HEART RATE: 70 BPM

## 2017-05-28 VITALS — OXYGEN SATURATION: 97 %

## 2017-05-28 VITALS
HEART RATE: 68 BPM | DIASTOLIC BLOOD PRESSURE: 69 MMHG | SYSTOLIC BLOOD PRESSURE: 142 MMHG | RESPIRATION RATE: 16 BRPM | OXYGEN SATURATION: 94 %

## 2017-05-28 VITALS
SYSTOLIC BLOOD PRESSURE: 140 MMHG | HEART RATE: 74 BPM | OXYGEN SATURATION: 95 % | RESPIRATION RATE: 19 BRPM | DIASTOLIC BLOOD PRESSURE: 71 MMHG

## 2017-05-28 VITALS
SYSTOLIC BLOOD PRESSURE: 132 MMHG | TEMPERATURE: 99.9 F | DIASTOLIC BLOOD PRESSURE: 79 MMHG | HEART RATE: 74 BPM | OXYGEN SATURATION: 90 % | RESPIRATION RATE: 19 BRPM

## 2017-05-28 VITALS — HEART RATE: 83 BPM

## 2017-05-28 VITALS — HEART RATE: 74 BPM

## 2017-05-28 VITALS
OXYGEN SATURATION: 91 % | RESPIRATION RATE: 18 BRPM | DIASTOLIC BLOOD PRESSURE: 73 MMHG | SYSTOLIC BLOOD PRESSURE: 165 MMHG | HEART RATE: 66 BPM

## 2017-05-28 VITALS
RESPIRATION RATE: 23 BRPM | DIASTOLIC BLOOD PRESSURE: 61 MMHG | HEART RATE: 92 BPM | SYSTOLIC BLOOD PRESSURE: 131 MMHG | TEMPERATURE: 99.1 F | OXYGEN SATURATION: 98 %

## 2017-05-28 VITALS
RESPIRATION RATE: 24 BRPM | HEART RATE: 70 BPM | OXYGEN SATURATION: 92 % | SYSTOLIC BLOOD PRESSURE: 133 MMHG | DIASTOLIC BLOOD PRESSURE: 67 MMHG

## 2017-05-28 VITALS
HEART RATE: 70 BPM | SYSTOLIC BLOOD PRESSURE: 164 MMHG | DIASTOLIC BLOOD PRESSURE: 82 MMHG | OXYGEN SATURATION: 96 % | RESPIRATION RATE: 31 BRPM

## 2017-05-28 VITALS — OXYGEN SATURATION: 93 %

## 2017-05-28 VITALS — HEART RATE: 66 BPM

## 2017-05-28 LAB
BASOPHILS # BLD AUTO: 0 TH/MM3 (ref 0–0.2)
BASOPHILS # BLD AUTO: 0 TH/MM3 (ref 0–0.2)
BASOPHILS NFR BLD: 0.1 % (ref 0–2)
BASOPHILS NFR BLD: 0.2 % (ref 0–2)
EOSINOPHIL # BLD: 0.2 TH/MM3 (ref 0–0.4)
EOSINOPHIL # BLD: 0.3 TH/MM3 (ref 0–0.4)
EOSINOPHIL NFR BLD: 1.6 % (ref 0–4)
EOSINOPHIL NFR BLD: 2.3 % (ref 0–4)
ERYTHROCYTE [DISTWIDTH] IN BLOOD BY AUTOMATED COUNT: 17 % (ref 11.6–17.2)
ERYTHROCYTE [DISTWIDTH] IN BLOOD BY AUTOMATED COUNT: 17.5 % (ref 11.6–17.2)
HCT VFR BLD CALC: 29.3 % (ref 39–51)
HCT VFR BLD CALC: 29.7 % (ref 39–51)
HEMO FLAGS: (no result)
HEMO FLAGS: (no result)
LYMPHOCYTES # BLD AUTO: 1.1 TH/MM3 (ref 1–4.8)
LYMPHOCYTES # BLD AUTO: 1.8 TH/MM3 (ref 1–4.8)
LYMPHOCYTES NFR BLD AUTO: 13.5 % (ref 9–44)
LYMPHOCYTES NFR BLD AUTO: 8.9 % (ref 9–44)
MCH RBC QN AUTO: 26.1 PG (ref 27–34)
MCH RBC QN AUTO: 26.8 PG (ref 27–34)
MCHC RBC AUTO-ENTMCNC: 32.6 % (ref 32–36)
MCHC RBC AUTO-ENTMCNC: 32.8 % (ref 32–36)
MCV RBC AUTO: 80.1 FL (ref 80–100)
MCV RBC AUTO: 81.8 FL (ref 80–100)
MONOCYTES NFR BLD: 3.7 % (ref 0–8)
MONOCYTES NFR BLD: 5.3 % (ref 0–8)
NEUTROPHILS # BLD AUTO: 10.4 TH/MM3 (ref 1.8–7.7)
NEUTROPHILS # BLD AUTO: 10.5 TH/MM3 (ref 1.8–7.7)
NEUTROPHILS NFR BLD AUTO: 78.7 % (ref 16–70)
NEUTROPHILS NFR BLD AUTO: 85.7 % (ref 16–70)
PLATELET # BLD: 323 TH/MM3 (ref 150–450)
PLATELET # BLD: 325 TH/MM3 (ref 150–450)
RBC # BLD AUTO: 3.63 MIL/MM3 (ref 4.5–5.9)
RBC # BLD AUTO: 3.66 MIL/MM3 (ref 4.5–5.9)
WBC # BLD AUTO: 12.3 TH/MM3 (ref 4–11)
WBC # BLD AUTO: 13.2 TH/MM3 (ref 4–11)

## 2017-05-28 RX ADMIN — OXYCODONE HYDROCHLORIDE AND ACETAMINOPHEN SCH MG: 500 TABLET ORAL at 08:22

## 2017-05-28 RX ADMIN — LOSARTAN POTASSIUM SCH MG: 50 TABLET, FILM COATED ORAL at 17:29

## 2017-05-28 RX ADMIN — INSULIN ASPART SCH: 100 INJECTION, SOLUTION INTRAVENOUS; SUBCUTANEOUS at 07:00

## 2017-05-28 RX ADMIN — ASPIRIN SCH MG: 81 TABLET ORAL at 08:22

## 2017-05-28 RX ADMIN — NYSTATIN SCH APPLIC: 100000 POWDER TOPICAL at 23:51

## 2017-05-28 RX ADMIN — SODIUM CHLORIDE SCH MLS/HR: 900 INJECTION INTRAVENOUS at 03:38

## 2017-05-28 RX ADMIN — NYSTATIN SCH APPLIC: 100000 POWDER TOPICAL at 12:34

## 2017-05-28 RX ADMIN — IPRATROPIUM BROMIDE AND ALBUTEROL SULFATE SCH AMPULE: .5; 3 SOLUTION RESPIRATORY (INHALATION) at 14:13

## 2017-05-28 RX ADMIN — INSULIN ASPART SCH: 100 INJECTION, SOLUTION INTRAVENOUS; SUBCUTANEOUS at 12:48

## 2017-05-28 RX ADMIN — OXYCODONE HYDROCHLORIDE AND ACETAMINOPHEN SCH MG: 500 TABLET ORAL at 22:34

## 2017-05-28 RX ADMIN — BACLOFEN SCH MG: 10 TABLET ORAL at 22:34

## 2017-05-28 RX ADMIN — Medication SCH ML: at 08:23

## 2017-05-28 RX ADMIN — PRAVASTATIN SODIUM SCH MG: 40 TABLET ORAL at 22:34

## 2017-05-28 RX ADMIN — Medication SCH ML: at 21:00

## 2017-05-28 RX ADMIN — FLUCONAZOLE SCH MG: 100 TABLET ORAL at 08:22

## 2017-05-28 RX ADMIN — CHLORHEXIDINE GLUCONATE SCH PACK: 500 CLOTH TOPICAL at 23:51

## 2017-05-28 RX ADMIN — TOLTERODINE TARTRATE SCH MG: 4 CAPSULE, EXTENDED RELEASE ORAL at 08:22

## 2017-05-28 RX ADMIN — IPRATROPIUM BROMIDE AND ALBUTEROL SULFATE SCH AMPULE: .5; 3 SOLUTION RESPIRATORY (INHALATION) at 07:37

## 2017-05-28 RX ADMIN — INSULIN ASPART SCH: 100 INJECTION, SOLUTION INTRAVENOUS; SUBCUTANEOUS at 22:40

## 2017-05-28 RX ADMIN — BUDESONIDE AND FORMOTEROL FUMARATE DIHYDRATE SCH PUFF: 160; 4.5 AEROSOL RESPIRATORY (INHALATION) at 08:23

## 2017-05-28 RX ADMIN — CLOTRIMAZOLE SCH MG: 10 LOZENGE ORAL at 22:34

## 2017-05-28 RX ADMIN — CLOTRIMAZOLE SCH MG: 10 LOZENGE ORAL at 17:29

## 2017-05-28 RX ADMIN — PROMETHAZINE HYDROCHLORIDE PRN MG: 25 TABLET ORAL at 02:54

## 2017-05-28 RX ADMIN — INSULIN ASPART SCH: 100 INJECTION, SOLUTION INTRAVENOUS; SUBCUTANEOUS at 17:31

## 2017-05-28 RX ADMIN — BUDESONIDE AND FORMOTEROL FUMARATE DIHYDRATE SCH PUFF: 160; 4.5 AEROSOL RESPIRATORY (INHALATION) at 23:51

## 2017-05-28 RX ADMIN — PANTOPRAZOLE SODIUM SCH MG: 20 TABLET, DELAYED RELEASE ORAL at 08:22

## 2017-05-28 RX ADMIN — ACETAMINOPHEN PRN MG: 325 TABLET ORAL at 02:54

## 2017-05-28 RX ADMIN — LEVOFLOXACIN SCH MG: 750 TABLET, FILM COATED ORAL at 12:33

## 2017-05-28 RX ADMIN — PHENYTOIN SODIUM SCH MLS/HR: 50 INJECTION INTRAMUSCULAR; INTRAVENOUS at 12:50

## 2017-05-28 RX ADMIN — IPRATROPIUM BROMIDE AND ALBUTEROL SULFATE SCH AMPULE: .5; 3 SOLUTION RESPIRATORY (INHALATION) at 19:43

## 2017-05-28 RX ADMIN — ENOXAPARIN SODIUM SCH MG: 40 INJECTION SUBCUTANEOUS at 08:22
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
## 2017-05-28 NOTE — HHI.IDPN
Subjective


Subjective


Remarks


pt s WBC going down to 13 K


afebrile 


feeling Ok, but cont to have sore throat


Growing C.albicans int he throat clx


Antibiotics


CFTX


Past Medical History


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


Neurogenic bladder


Type 2 diabetes


Hypertension


Dyslipidemia


Obstructive sleep apnea


Asthma COPD


Kidney stones


Past Surgical History


Cervical spine fusion in 2007


Colostomy in 2007


Allergies:  


Coded Allergies:  


     Morphine (Verified  Allergy, Severe, 5/20/17)


 HALLUCINATIONS


     Sulfa (Verified  Allergy, Severe, 5/20/17)


 SKIN RASH


     *MDRO Multi-Drug Resistant Organism (Verified  Allergy, Unknown, 5/22/17)


 MRSA PCR Positive 5/20/17


 MRSA 2008


 Enterococcus faecium VRE 2007


 Carbapenem-resistant Acinetobacter baumannii 2007





Objective


.





 Vital Signs








  Date Time  Temp Pulse Resp B/P Pulse Ox O2 Delivery O2 Flow Rate FiO2


 


5/28/17 08:00  74 19 139/59 97   


 


5/28/17 07:39     93   21


 


5/28/17 07:09 98.8 70 21 138/89 94   


 


5/28/17 06:01  66 18 165/73 91   


 


5/28/17 06:00  66      


 


5/28/17 05:01  68 18 156/74 92   


 


5/28/17 04:01 99.1 72 19 141/68 95   


 


5/28/17 04:00  74      


 


5/28/17 03:01  70 24 133/67 92   


 


5/28/17 02:00  70      


 


5/28/17 02:00  70 22 133/62 91   


 


5/28/17 01:01  82 28 128/68 90   


 


5/28/17 00:01 99.9 74 19 132/79 90   


 


5/28/17 00:00  74      


 


5/27/17 23:01  76 26 138/62 94   


 


5/27/17 22:01  80 12 123/57 93   


 


5/27/17 22:00  80      


 


5/27/17 21:01  82 24 110/59 93   


 


5/27/17 20:07     94   


 


5/27/17 20:01 99.6 76 24 114/58 94   


 


5/27/17 20:00  77      


 


5/27/17 19:01  84 27 112/53 94   


 


5/27/17 18:01  80 26 113/54 95   


 


5/27/17 18:00  79      


 


5/27/17 17:01  82 21 113/49 94   


 


5/27/17 16:01 99.1 82 20 107/46 92   


 


5/27/17 16:00  82      


 


5/27/17 15:00  84 25 121/53 93   


 


5/27/17 14:01  76 26 132/62 96   


 


5/27/17 14:00  74      


 


5/27/17 13:01  68 21 120/55 95   


 


5/27/17 12:01 98.8 70 25 120/75    


 


5/27/17 12:00  72      














 5/27/17 5/27/17 5/28/17





 15:00 23:00 07:00


 


Intake Total 1052 ml 729 ml 609 ml


 


Output Total 1950 ml 1300 ml 2300 ml


 


Balance -898 ml -571 ml -1691 ml


 


   


 


Intake Oral 480 ml 360 ml 240 ml


 


IV Total 572 ml 369 ml 369 ml


 


Output Urine Total 1900 ml 1300 ml 2300 ml


 


Stool Total 50 ml  








.





Laboratory Tests








Test 5/27/17 5/28/17





 05:00 04:40


 


White Blood Count 15.5 TH/MM3 13.2 TH/MM3


 


Red Blood Count 3.58 MIL/MM3 3.66 MIL/MM3


 


Hemoglobin 9.7 GM/DL 9.6 GM/DL


 


Hematocrit 28.5 % 29.3 %


 


Mean Corpuscular Volume 79.6 FL 80.1 FL


 


Mean Corpuscular Hemoglobin 27.0 PG 26.1 PG


 


Mean Corpuscular Hemoglobin 33.9 % 32.6 %





Concent  


 


Red Cell Distribution Width 17.3 % 17.0 %


 


Platelet Count 245 TH/MM3 323 TH/MM3


 


Mean Platelet Volume 8.9 FL 8.6 FL


 


Neutrophils (%) (Auto) 83.2 % 78.7 %


 


Lymphocytes (%) (Auto) 10.4 % 13.5 %


 


Monocytes (%) (Auto) 3.1 % 5.3 %


 


Eosinophils (%) (Auto) 2.8 % 2.3 %


 


Basophils (%) (Auto) 0.5 % 0.2 %


 


Neutrophils # (Auto) 12.9 TH/MM3 10.4 TH/MM3


 


Lymphocytes # (Auto) 1.6 TH/MM3 1.8 TH/MM3


 


Monocytes # (Auto) 0.5 TH/MM3 0.7 TH/MM3


 


Eosinophils # (Auto) 0.4 TH/MM3 0.3 TH/MM3


 


Basophils # (Auto) 0.1 TH/MM3 0.0 TH/MM3


 


CBC Comment DIFF FINAL  DIFF FINAL 


 


Differential Comment    








Laboratory Tests








Test 5/26/17 5/27/17





 19:55 05:00


 


Potassium Level 3.6 MEQ/L 3.6 MEQ/L


 


Phosphorus Level 3.0 MG/DL 


 


Magnesium Level 1.7 MG/DL 


 


Sodium Level  137 MEQ/L


 


Chloride Level  102 MEQ/L


 


Carbon Dioxide Level  27.7 MEQ/L


 


Anion Gap  7 MEQ/L


 


Blood Urea Nitrogen  16 MG/DL


 


Creatinine  0.86 MG/DL


 


Estimat Glomerular Filtration  89 ML/MIN





Rate  


 


Random Glucose  144 MG/DL


 


Calcium Level  7.9 MG/DL


 


Total Bilirubin  0.5 MG/DL


 


Aspartate Amino Transf  11 U/L





(AST/SGOT)  


 


Alanine Aminotransferase  17 U/L





(ALT/SGPT)  


 


Alkaline Phosphatase  70 U/L


 


Total Protein  6.0 GM/DL


 


Albumin  2.0 GM/DL








Microbiology








 Date/Time Procedure Status





Source Growth 


 


 5/26/17 15:20 Throat Culture - Preliminary Resulted





Throat Candida Albicans 








Imaging





Last Impressions








Chest X-Ray 5/25/17 0600 Signed





Impressions: 





 Service Date/Time:  Thursday, May 25, 2017 03:53 - CONCLUSION:  Normal 





 examination status post left central line placement.       Roberth Hernández MD 


 


Nephrostomy 5/23/17 0000 Signed





Impressions: 





 Service Date/Time:  Tuesday, May 23, 2017 16:31 - CONCLUSION: Uncomplicated 





 nephrostomy tube placement as above.     Mike Watts MD 


 


Abdomen/Pelvis CT 5/23/17 0000 Signed





Impressions: 





 Service Date/Time:  Tuesday, May 23, 2017 10:26 - CONCLUSION:  1. There are 





 innumerable nonobstructing stones in the right renal collecting system 

measuring 





 up to 15 x 11 mm. At the right UPJ there is a 15 x 8 mm stone causing moderate 





 right hydronephrosis. 2. There are 5 nonobstructing stones in left kidney 





 measuring up to 8 mm. There is no left hydronephrosis. 3. Multiple urinary 





 bladder stones are present. 4. Small volume of free fluid is present in the 





 right paracolic gutter region. 5. Nonacute findings include atelectasis versus 





 consolidation the lung bases, severe atherosclerotic disease, and fat-

containing 





 right abdominal wall hernia.     Mike Wade MD 


 


Renal Ultrasound 5/22/17 0000 Signed





Impressions: 





 Service Date/Time:  Monday, May 22, 2017 08:28 - CONCLUSION:  Several left 





 kidney stones and mild hydronephrosis. Recommend stone protocol CT for further 





 evaluation.     Mike Watts MD 








Physical Exam


GENERAL: This is a chronically ill, patient with quadriparesis, in no apparent 

distress.


LINES: L SCV TLVC in place with no skin changes


SKIN: No rashes, ecchymoses or lesions. Cool and dry.


HEENT: oral mucosae  with no  thrush today 


NECK: Trachea midline.


CARDIOVASCULAR: Regular rate and rhythm without murmurs, gallops, or rubs. 


RESPIRATORY: Clear to auscultation. Breath sounds equal bilaterally. No wheezes

, rales, or rhonchi.  


GASTROINTESTINAL: Abdomen soft, Obese non-tender, nondistended.


MUSCULOSKELETAL: Extremities without  cyanosis, 


Prominent B/L edema 2- 3+


: hughes in place with clear light yellow  urine


R nephrostomy in place with clear light yellow urine


NEUROLOGICAL: awake; alert, normla speech; fairly strong BUE exceppt fine 

finger movement s


BLE complatrely plegic





Assessment & Plan


Remarks


 Severe sepsis


   - clincially improved 


UTI, E.coli, Proteus with secondary bacteremia  2/2 E.coli


   -complicated UTI , in the settings of R sided  obstructive uropahty 2/2 

kidney stone


   - sp R nephrostomy


   - both E.coli and Proteu are S to levaquine


Chronic hughes 2/2 neurogenic bladder


Imcomplete quadriplegia


ARF - resolved


Leukocytosis - resolving


Lactic acidosis - resolved


Persistent low grade fever - iresolvesd


Oral thrush related to abx use - resolving with Fluc








Plan: cont  dc Ceftriaxone 2 g daily


   - startt Levaquine PO to complete  2 weeks of  abx course


   - OK to  discharge from [facility] 


   - fluconazole 1 wk  for thrush





dw RN


dw wife and pt








Alpa Ansari MD May 28, 2017 11:57

## 2017-05-29 VITALS
DIASTOLIC BLOOD PRESSURE: 77 MMHG | TEMPERATURE: 97.9 F | HEART RATE: 62 BPM | RESPIRATION RATE: 20 BRPM | OXYGEN SATURATION: 92 % | SYSTOLIC BLOOD PRESSURE: 122 MMHG

## 2017-05-29 VITALS
DIASTOLIC BLOOD PRESSURE: 63 MMHG | TEMPERATURE: 98.3 F | HEART RATE: 87 BPM | RESPIRATION RATE: 16 BRPM | OXYGEN SATURATION: 97 % | SYSTOLIC BLOOD PRESSURE: 120 MMHG

## 2017-05-29 VITALS
OXYGEN SATURATION: 97 % | SYSTOLIC BLOOD PRESSURE: 157 MMHG | TEMPERATURE: 98 F | RESPIRATION RATE: 18 BRPM | HEART RATE: 73 BPM | DIASTOLIC BLOOD PRESSURE: 79 MMHG

## 2017-05-29 VITALS
HEART RATE: 82 BPM | OXYGEN SATURATION: 92 % | TEMPERATURE: 97.6 F | RESPIRATION RATE: 19 BRPM | SYSTOLIC BLOOD PRESSURE: 141 MMHG | DIASTOLIC BLOOD PRESSURE: 64 MMHG

## 2017-05-29 VITALS — OXYGEN SATURATION: 92 %

## 2017-05-29 RX ADMIN — LEVOFLOXACIN SCH MG: 750 TABLET, FILM COATED ORAL at 10:16

## 2017-05-29 RX ADMIN — LOSARTAN POTASSIUM SCH MG: 50 TABLET, FILM COATED ORAL at 10:15

## 2017-05-29 RX ADMIN — TOLTERODINE TARTRATE SCH MG: 4 CAPSULE, EXTENDED RELEASE ORAL at 10:15

## 2017-05-29 RX ADMIN — ASPIRIN SCH MG: 81 TABLET ORAL at 10:15

## 2017-05-29 RX ADMIN — GABAPENTIN SCH MG: 300 CAPSULE ORAL at 10:16

## 2017-05-29 RX ADMIN — FLUCONAZOLE SCH MG: 100 TABLET ORAL at 10:15

## 2017-05-29 RX ADMIN — INSULIN ASPART SCH: 100 INJECTION, SOLUTION INTRAVENOUS; SUBCUTANEOUS at 12:30

## 2017-05-29 RX ADMIN — PANTOPRAZOLE SODIUM SCH MG: 20 TABLET, DELAYED RELEASE ORAL at 10:16

## 2017-05-29 RX ADMIN — INSULIN ASPART SCH: 100 INJECTION, SOLUTION INTRAVENOUS; SUBCUTANEOUS at 06:21

## 2017-05-29 RX ADMIN — BUDESONIDE AND FORMOTEROL FUMARATE DIHYDRATE SCH PUFF: 160; 4.5 AEROSOL RESPIRATORY (INHALATION) at 09:00

## 2017-05-29 RX ADMIN — OXYCODONE HYDROCHLORIDE AND ACETAMINOPHEN SCH MG: 500 TABLET ORAL at 10:15

## 2017-05-29 RX ADMIN — IPRATROPIUM BROMIDE AND ALBUTEROL SULFATE PRN AMPULE: .5; 3 SOLUTION RESPIRATORY (INHALATION) at 09:23

## 2017-05-29 RX ADMIN — ENOXAPARIN SODIUM SCH MG: 40 INJECTION SUBCUTANEOUS at 10:16

## 2017-05-29 RX ADMIN — BACLOFEN SCH MG: 10 TABLET ORAL at 10:16

## 2017-05-29 RX ADMIN — NYSTATIN SCH APPLIC: 100000 POWDER TOPICAL at 09:00

## 2017-05-29 RX ADMIN — CLOTRIMAZOLE SCH MG: 10 LOZENGE ORAL at 06:17

## 2017-05-29 RX ADMIN — Medication SCH ML: at 09:00

## 2017-05-29 RX ADMIN — CLOTRIMAZOLE SCH MG: 10 LOZENGE ORAL at 10:15
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
## 2017-05-29 NOTE — HHI.DS
Discharge Summary


Admission Date


May 20, 2017 at 20:31


Discharge Date:  May 29, 2017


Admitting Diagnosis


severe sepsis; uti; renal insufficiency;copd; quadriplegia





(1) Severe sepsis


Diagnosis:  Principal





(2) Lactic acidemia


Diagnosis:  Principal





(3) UTI (urinary tract infection)


Diagnosis:  Principal





(4) Hypotension


Diagnosis:  Principal





(5) Acute kidney failure


Diagnosis:  Principal





(6) Quadriplegia, C5-C7 incomplete


Diagnosis:  Secondary





(7) Neurogenic bladder


Diagnosis:  Secondary





(8) Obesity


Diagnosis:  Secondary





(9) Depression


Diagnosis:  Secondary





(10) Type 2 diabetes mellitus


Diagnosis:  Secondary





(11) Thrush


Diagnosis:  Secondary





Consultants


Infectious Disease


Interventional radiology


Urology


Procedures


LT IJ line intensivists


Rt nephrostomy tube by interventional radiology


Brief History


68 y/o male with quadroplegia, dm, htn , depression, copd presented to [facility] with 

fever cough congestion . Had elvated lactic acid, ARF and hypotension. Admitted 

to ICU by intensivists


CBC/BMP:  


5/28/17 1745                                                                   

             5/27/17 0500





Significant Findings





Laboratory Tests








Test 5/27/17 5/28/17 5/28/17





 05:00 04:40 17:45


 


White Blood Count 15.5 TH/MM3 13.2 TH/MM3 12.3 TH/MM3





 (4.0-11.0) (4.0-11.0) (4.0-11.0)


 


Red Blood Count 3.58 MIL/MM3 3.66 MIL/MM3 3.63 MIL/MM3





 (4.50-5.90) (4.50-5.90) (4.50-5.90)


 


Hemoglobin 9.7 GM/DL 9.6 GM/DL 9.7 GM/DL





 (13.0-17.0) (13.0-17.0) (13.0-17.0)


 


Hematocrit 28.5 % 29.3 % 29.7 %





 (39.0-51.0) (39.0-51.0) (39.0-51.0)


 


Mean Corpuscular Volume 79.6 FL  





 (80.0-100.0)  


 


Red Cell Distribution Width 17.3 %  17.5 %





 (11.6-17.2)  (11.6-17.2)


 


Neutrophils (%) (Auto) 83.2 % 78.7 % 85.7 %





 (16.0-70.0) (16.0-70.0) (16.0-70.0)


 


Neutrophils # (Auto) 12.9 TH/MM3 10.4 TH/MM3 10.5 TH/MM3





 (1.8-7.7) (1.8-7.7) (1.8-7.7)


 


Random Glucose 144 MG/DL  





 ()  


 


Calcium Level 7.9 MG/DL  





 (8.5-10.1)  


 


Aspartate Amino Transf 11 U/L (15-37)  





(AST/SGOT)   


 


Total Protein 6.0 GM/DL  





 (6.4-8.2)  


 


Albumin 2.0 GM/DL  





 (3.4-5.0)  


 


Mean Corpuscular Hemoglobin  26.1 PG 26.8 PG





  (27.0-34.0) (27.0-34.0)


 


Lymphocytes (%) (Auto)   8.9 %





   (9.0-44.0)








PE at Discharge


Lying in bed 


white patches base if tongue improved


+bs nontender


lower extremities in protectors


urine in hughes and nephrostomy bags clear


Transfer Summary


This is a 67-year-old male who was admitted with urosepsis.  Off vasopressors 

48 hours.  Stable to transfer to hospitalist service


Hospital Course


68 y/o admitted by intensivists with severe urosepsis , hypotension , ARF . 

Required pressors to maintain blood pressure. CT abdomen showed obstructive 

uropathy, nephrostomy tube was placed by interventional radiology. Patient was 

seen by ID. Cultures eventually grew out proteus and e.coli/ Was on Rocephin 

until transitioned to levaquin day prior to discharge. He developed thrush and 

was also placed on diflucan. Urolgy saw the patient and recommended he complete 

14 days on antibiotcs an be discharge with the nephrostomy tube and f/u with Dr Camargo.


Pt Condition on Discharge:  Stable


Discharge Disposition:  Discharge Home


Discharge Instructions


DIET: Follow Instructions for:  Diabetic Diet


Activities you can perform:  Regular-No Restrictions


Follow up Referrals:  


PCP Follow-up - 1 Week with 


Urology - 2-3 Days with Dr Camargo





New Medications:  


Clotrimazole Kam (Clotrimazole Kam) 10 Mg Troc


10 MG BUCCAL 5 TIMES A DAY thrush #30 KAM


Fluconazole (Diflucan) 100 Mg Tab


100 MG PO DAILY thrush #5 TAB


Levofloxacin (Levofloxacin) 750 Mg Tablet


750 MG PO DAILY urosepsis #12 TAB


 


Continued Medications:  


Albuterol 6.7 GM Inh (Proventil Hfa 6.7 GM Inh) 90 Mcg/Act Aer


1 PUFF INH Q6H PRN SHORTNESS OF BREATH Ref 0 INHALER


Ascorbic Acid (Ascorbic Acid) 500 Mg Tab


500 MG PO BID TAB


Aspirin (Aspirin) 81 Mg Tabdr


81 MG PO DAILY TAB


Baclofen (Baclofen) 10 Mg Tab


10 MG PO BID MUSCLE SPASM Ref 0 TAB


Budesonide-Formoterol Inh (Symbicort Inh) 160-4.5 Mcg/Act Aero


2 PUFF INH Q12HR #1 Ref 0 INHALER


Desipramine (Desipramine) 25 Mg Tab


25 MG PO HS Ref 0 TAB


Diphenhydramine (Diphenhydramine) 25 Mg Tab


25 MG PO HS ALLERGIES Ref 0 TAB


Gabapentin (Gabapentin) 300 Mg Cap


300 MG PO BID Ref 0 CAP


Hydrochlorothiazide (Hydrochlorothiazide) 25 Mg Tab


25 MG PO DAILY Ref 0 TAB


Losartan (Losartan) 50 Mg Tab


100 MG PO DAILY Ref 0 TAB


Omeprazole (Omeprazole) 20 Mg Tab


20 MG PO DAILY Ref 0 TAB


Oxybutynin ER 24 HR (Oxybutynin ER 24 HR) 5 Mg Tab


5 MG PO BID Ref 0 TAB


Promethazine (Phenergan) 25 Mg Tab


25 MG PO Q4HR PRN NAUSEA Ref 0 TAB


Simvastatin (Simvastatin) 20 Mg Tab


20 MG PO HS Cholesterol Management Ref 0 TAB











Carole Stewart MD May 29, 2017 13:36

## 2018-03-08 ENCOUNTER — HOSPITAL ENCOUNTER (EMERGENCY)
Dept: HOSPITAL 17 - NEPC | Age: 68
Discharge: HOME | End: 2018-03-08
Payer: COMMERCIAL

## 2018-03-08 VITALS
HEART RATE: 120 BPM | RESPIRATION RATE: 18 BRPM | OXYGEN SATURATION: 96 % | SYSTOLIC BLOOD PRESSURE: 129 MMHG | DIASTOLIC BLOOD PRESSURE: 67 MMHG

## 2018-03-08 VITALS — BODY MASS INDEX: 46.77 KG/M2 | WEIGHT: 291.01 LBS | HEIGHT: 66 IN

## 2018-03-08 VITALS
TEMPERATURE: 98.3 F | OXYGEN SATURATION: 97 % | DIASTOLIC BLOOD PRESSURE: 57 MMHG | SYSTOLIC BLOOD PRESSURE: 135 MMHG | RESPIRATION RATE: 18 BRPM | HEART RATE: 106 BPM

## 2018-03-08 VITALS — RESPIRATION RATE: 20 BRPM

## 2018-03-08 DIAGNOSIS — E11.9: ICD-10-CM

## 2018-03-08 DIAGNOSIS — G82.20: ICD-10-CM

## 2018-03-08 DIAGNOSIS — I10: ICD-10-CM

## 2018-03-08 DIAGNOSIS — J18.9: ICD-10-CM
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
## 2018-03-08 DIAGNOSIS — R51: ICD-10-CM

## 2018-03-08 DIAGNOSIS — F32.9: ICD-10-CM

## 2018-03-08 DIAGNOSIS — E78.00: ICD-10-CM

## 2018-03-08 DIAGNOSIS — M54.2: Primary | ICD-10-CM

## 2018-03-08 DIAGNOSIS — J44.9: ICD-10-CM

## 2018-03-08 LAB
BASOPHILS # BLD AUTO: 0 TH/MM3 (ref 0–0.2)
BASOPHILS NFR BLD: 0.3 % (ref 0–2)
BUN SERPL-MCNC: 27 MG/DL (ref 7–18)
CALCIUM SERPL-MCNC: 9.3 MG/DL (ref 8.5–10.1)
CHLORIDE SERPL-SCNC: 102 MEQ/L (ref 98–107)
CREAT SERPL-MCNC: 0.99 MG/DL (ref 0.6–1.3)
EOSINOPHIL # BLD: 0.4 TH/MM3 (ref 0–0.4)
EOSINOPHIL NFR BLD: 2.8 % (ref 0–4)
ERYTHROCYTE [DISTWIDTH] IN BLOOD BY AUTOMATED COUNT: 19.2 % (ref 11.6–17.2)
GFR SERPLBLD BASED ON 1.73 SQ M-ARVRAT: 76 ML/MIN (ref 89–?)
GLUCOSE SERPL-MCNC: 151 MG/DL (ref 74–106)
HCO3 BLD-SCNC: 24.7 MEQ/L (ref 21–32)
HCT VFR BLD CALC: 38.2 % (ref 39–51)
HGB BLD-MCNC: 12.6 GM/DL (ref 13–17)
INR PPP: 1.1 RATIO
LYMPHOCYTES # BLD AUTO: 1.8 TH/MM3 (ref 1–4.8)
LYMPHOCYTES NFR BLD AUTO: 12.7 % (ref 9–44)
MCH RBC QN AUTO: 26.3 PG (ref 27–34)
MCHC RBC AUTO-ENTMCNC: 32.9 % (ref 32–36)
MCV RBC AUTO: 79.9 FL (ref 80–100)
MONOCYTE #: 0.7 TH/MM3 (ref 0–0.9)
MONOCYTES NFR BLD: 4.9 % (ref 0–8)
NEUTROPHILS # BLD AUTO: 11.1 TH/MM3 (ref 1.8–7.7)
NEUTROPHILS NFR BLD AUTO: 79.3 % (ref 16–70)
PLATELET # BLD: 276 TH/MM3 (ref 150–450)
PMV BLD AUTO: 8.3 FL (ref 7–11)
PROTHROMBIN TIME: 10.9 SEC (ref 9.8–11.6)
RBC # BLD AUTO: 4.79 MIL/MM3 (ref 4.5–5.9)
SODIUM SERPL-SCNC: 138 MEQ/L (ref 136–145)
WBC # BLD AUTO: 14 TH/MM3 (ref 4–11)

## 2018-03-08 PROCEDURE — 86850 RBC ANTIBODY SCREEN: CPT

## 2018-03-08 PROCEDURE — 70486 CT MAXILLOFACIAL W/O DYE: CPT

## 2018-03-08 PROCEDURE — 99284 EMERGENCY DEPT VISIT MOD MDM: CPT

## 2018-03-08 PROCEDURE — 85025 COMPLETE CBC W/AUTO DIFF WBC: CPT

## 2018-03-08 PROCEDURE — 72132 CT LUMBAR SPINE W/DYE: CPT

## 2018-03-08 PROCEDURE — 70450 CT HEAD/BRAIN W/O DYE: CPT

## 2018-03-08 PROCEDURE — 86901 BLOOD TYPING SEROLOGIC RH(D): CPT

## 2018-03-08 PROCEDURE — 71260 CT THORAX DX C+: CPT

## 2018-03-08 PROCEDURE — 74177 CT ABD & PELVIS W/CONTRAST: CPT

## 2018-03-08 PROCEDURE — 72129 CT CHEST SPINE W/DYE: CPT

## 2018-03-08 PROCEDURE — 72125 CT NECK SPINE W/O DYE: CPT

## 2018-03-08 PROCEDURE — 80048 BASIC METABOLIC PNL TOTAL CA: CPT

## 2018-03-08 PROCEDURE — 86900 BLOOD TYPING SEROLOGIC ABO: CPT

## 2018-03-08 PROCEDURE — 85730 THROMBOPLASTIN TIME PARTIAL: CPT

## 2018-03-08 PROCEDURE — 85610 PROTHROMBIN TIME: CPT
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
## 2018-06-14 ENCOUNTER — HOSPITAL ENCOUNTER (OUTPATIENT)
Dept: HOSPITAL 17 - HEND | Age: 68
End: 2018-06-14
Attending: SPECIALIST
Payer: MEDICARE

## 2018-06-14 VITALS
SYSTOLIC BLOOD PRESSURE: 109 MMHG | TEMPERATURE: 98.3 F | DIASTOLIC BLOOD PRESSURE: 52 MMHG | HEART RATE: 85 BPM | RESPIRATION RATE: 20 BRPM | OXYGEN SATURATION: 97 %

## 2018-06-14 VITALS — WEIGHT: 289.91 LBS | BODY MASS INDEX: 37.21 KG/M2 | HEIGHT: 74 IN

## 2018-06-14 DIAGNOSIS — K20.9: ICD-10-CM

## 2018-06-14 DIAGNOSIS — Z98.84: ICD-10-CM

## 2018-06-14 DIAGNOSIS — K29.50: Primary | ICD-10-CM

## 2018-06-14 DIAGNOSIS — I44.0: ICD-10-CM

## 2018-06-14 DIAGNOSIS — G82.50: ICD-10-CM

## 2018-06-14 PROCEDURE — 00731 ANES UPR GI NDSC PX NOS: CPT

## 2018-06-14 PROCEDURE — 93005 ELECTROCARDIOGRAM TRACING: CPT

## 2018-06-14 PROCEDURE — 88312 SPECIAL STAINS GROUP 1: CPT

## 2018-06-14 PROCEDURE — 43239 EGD BIOPSY SINGLE/MULTIPLE: CPT

## 2018-06-14 PROCEDURE — 88305 TISSUE EXAM BY PATHOLOGIST: CPT

## 2018-06-14 NOTE — EKG
Date Performed: 06/14/2018       Time Performed: 08:07:53

 

PTAGE:      68 years

 

EKG:      Sinus rhythm 

 

 WITH FIRST DEGREE AV BLOCK ABNORMAL ECG when compared to prior EKG, patient is no longer tachycardic
 

 

 PREVIOUS TRACING            : 05/20/2017 18.38

 

DOCTOR:   Janeen Washington  Interpretating Date/Time  06/14/2018 18:53:09

## 2018-06-14 NOTE — GIPROC
[facility]

[address] [facility]. [facility], [address]

 

 

EGD PROCEDURE REPORT     EXAM DATE: 06/14/2018

 

PATIENT NAME:      Aleksandar Worthy           MR #:      [id number]

YOB: 1950      VISIT #:     [id number]

ATTENDING:     Kylie Renner MD     ORDER #:     [id number]

ASSISTANT:      Andrzej Del Rio and Dian Lara     STATUS:     outpatient

 

 

INDICATIONS:  The patient is a 68 yr old male here for an EGD due to abdominal

pain

PROCEDURE PERFORMED:     EGD w/ biopsy

MEDICATIONS:     None and Per Anesthesia.

TOPICAL ANESTHETIC:     none

 

CONSENT: The patient understands the risks and benefits of the procedure and

understands that these risks include, but are not limited to: sedation,

allergic reaction, infection, perforation and/or bleeding. Alternative means of

evaluation and treatment include, among others: physical exam, x-rays, and/or

surgical intervention. The patient elects to proceed with this endoscopic

procedure.

 



medical equipment was checked for proper function. Hand hygiene and appropriate

measures for infection prevention was taken. After the risks, benefits and

alternatives of the procedure were thoroughly explained, Informed consent was

verified, confirmed and timeout was successfully executed by the treatment

team. The patient was anesthetized with topical anesthesia and the Pentax

EG-[id number] endoscope was introduced through the mouth and advanced to the second

portion of the duodenum.  Retroflexion was performed and was normal  The

gastroscope was then slowly withdrawn and removed.

 

ESOPHAGUS: There was LA Class B esophagitis noted.  Multiple biopsies were

performed using cold forceps.  Sample sent for histology.

 

STOMACH: Evidence of previous surgery likely sleeve gastrectomy causing mid

stomach deformity and angulation.   There was moderate gastritis in the gastric

antrum.   A soft and erosion overlying medium nodule was located in the gastric

antrum.  Multiple biopsies were performed using cold forceps.  Sample sent for

histology.

 

DUODENUM: The duodenal mucosa appeared normal in the duodenal bulb, 2nd part

duodenum, and 3rd part duodenum.

 

 

 

ADVERSE EVENTS:     There were no complications.

IMPRESSIONS:     1.  There was esophagitis noted with diffuse sloughing of the

mucosa; multiple biopsies were performed

2.  Evidence of previous surgery likely sleeve gastrectomy causing mid stomach

deformity and angulation

3.  There was gastritis in the gastric antrum

4.  A medium nodule was located in the gastric antrum; multiple biopsies were

performed

5.  Normal duodenal mucosa in the duodenal bulb, 2nd part duodenum, and 3rd part

duodenum

 

 

RECOMMENDATIONS:     1.  Await biopsy results.  Biopsy results will not be ready

for 7-10 days.  If you don't hear from us in two weeks, call our office for

biopsy results.

2.  Continue PPI

PATIENT CONDITION:     stable

DISPOSITION:     Observation

REPEAT EXAM:     NONE

 

 

___________________________________

Kylie Renner MD

eSigned:  Kylie Renner MD 06/14/2018 11:00 AM

 

 

cc:

 

 

 

 

PATIENT NAME:  Aleksandar Worthy

MR#: [id number]

## 2018-06-20 NOTE — HHI.CCPN
Subjective


Remarks/Hospital Course


Patient is a 67-year-old  male with past medical history of incomplete 

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 

self-catheterization, type 2 diabetes, hypertension, COPD/asthma, obstructive 

sleep apnea and obesity who presented to the [facility] emergency department 

with cough congestion, fever chills and weakness.  Patient states the symptoms 

started 5/19/17.  Cough is nonproductive.  Patient also feels that he may be 

having a urinary tract infection as he has seen color change, urine was bloody 

and somewhat cloudy.  In the ER patient had a blood pressure of 93/64, MAXIMUM 

TEMPERATURE was 103.  Lab workup showed that white count was 17.4, lactic acid 

2.4.  BUN was 27 creatinine 1.6 his previous creatinine 2015 was 0.62.  UA 

showed evidence of UTI.  Patient was admitted to critical care service to the 

ICU.  He received 2 L IV fluid boluses and also was placed on vancomycin and 

azithromycin and Zosyn. I evaluated the patient in ICU.  He appears ill but 

improving.  Blood pressure has stabilized.  He is making urine.  Fever trending 

down





SUBJ 5/22: Became hypotensive tachycardic yesterday evening with fever up to 

104.  Central line was placed, Levaquin added and single dose of gentamicin.  

Currently on vasopressin.  Mike fever 101 at 4 AM.  White count increasing 

16.6 today, creatinine stable at 1.7. UO 2L in 24 hour. Bld cx E Coli, urine cx 

GNR.  As the patient is still in septic shock, stopped Zosyn start meropenem.  

ID consult pending.





5/23: Off vasopressin.  Became short of breath this morning.  Chest x-ray 

suggests fluid overload and fluids cut down to 60 cc per hour and given Lasix 

20 mg IV stat.  Urine and blood cultures from 5/20 growing Escherichia coli 

sensitive to Rocephin.





Objective





 Vital Signs








  Date Time  Temp Pulse Resp B/P Pulse Ox O2 Delivery O2 Flow Rate FiO2


 


5/23/17 02:00  78      


 


5/23/17 02:00   25 116/65 99   


 


5/23/17 00:00 98.8       


 


5/22/17 19:40      Nasal Cannula 2.00 


 


5/20/17 20:38        21








 Intake and Output








 5/22/17 5/22/17 5/22/17





 07:59 15:59 23:59


 


Intake Total 1160 ml 1690 ml 2950 ml


 


Output Total 350 ml 660 ml 400 ml


 


Balance 810 ml 1030 ml 2550 ml








Result Diagram:  


5/22/17 0930                                                                   

             5/22/17 0430





Other Results





Microbiology








 Date/Time Procedure Status





Source Growth 


 


 5/20/17 19:20 Urine Culture - Final Complete





Urine Catheterized Urine Escherichia Coli 





 Proteus Mirabilis 








Imaging





Last Impressions








Chest X-Ray 5/23/17 0600 Signed





Impressions: 





 Service Date/Time:  Tuesday, May 23, 2017 06:30 - CONCLUSION: Slight CHF.    

K. 





 Jose Cochran MD 


 


Renal Ultrasound 5/22/17 0000 Signed





Impressions: 





 Service Date/Time:  Monday, May 22, 2017 08:28 - CONCLUSION:  Several left 





 kidney stones and mild hydronephrosis. Recommend stone protocol CT for further 





 evaluation.     Mike Watts MD 








Objective Remarks


GENERAL: Well-nourished, well-developed patient.  Laying in bed in minimal 

respiratory distress, on nasal cannula 4.5 L/m


SKIN: Skin warm and dry


HEAD: Normocephalic.


EYES: No scleral icterus. No injection or drainage. 


NECK: Supple, trachea midline. No JVD or lymphadenopathy.


CARDIOVASCULAR: Tachycardic rate and rhythm without murmurs, gallops, or rubs. 


RESPIRATORY: Breath sounds equal bilaterally.  Scattered rhonchi, no wheezing 

or crackles.  Slightly tachypneic/dyspneic


GASTROINTESTINAL: Abdomen soft, non-tender, nondistended.  Colostomy in place.


MUSCULOSKELETAL: Trace edema


BACK: Nontender without obvious deformity.  Well-healed sacral decubitus ulcer


NEURO: Alert awake oriented.  Patient is quadriplegic C5 C7 incomplete. 4/5 

muscle strength upper extremity, 1/5 in [place], neurogenic bladder


Vascular Central Line Catheter:  Yes


Assessment to:  Continue


Line:  Central Venous Catheter


Side:  Left


Location:  [place]





A/P


Assessment and Plan


NEURO: 


Incomplete quadriplegia from cervical spine injury


Neurogenic bladder


-Minimize sedation, hold home meds (gabapentin, desipramine and baclofen)


-PT evaluation and treatment





RESP: 


History of COPD asthma


Obstructive sleep apnea


Acute respiratory failure


-Nasal cannula oxygen, if O2 sats dropped to consider Ventimask/BiPAP.


- Lasix 20 mg IV stat to mobilize fluid in view of significant positive fluid 

balance and evidence of fluid overload on chest x-ray done on 5/23.


-DuoNeb every 6 hours and when necessary


-Use home CPAP





CV: 


Septic shock


Lactic acidemia 


-13.5 L positive fluid balance over the last 3 days due to fluid resuscitation 

and septic shock.  Adequately resuscitated currently.  Decreased IV fluids to 

60 cc per hour and give Lasix 20 mg IV stat to mobilize fluid in view of 

evidence of fluid overload on chest x-ray 5/23..


-Lactic acid at 2.7 on 5/22, repeat lactate level later today.


-Holding home antihypertensives, continue aspirin


-Vasopressin infusion to keep map above 65, currently off.





GI: 


Status post colostomy


-Heart healthy diet. PPI


-Colostomy management





: 


Acute kidney injury secondary to sepsis and dehydration 


Pyelonephritis


Multiple renal calculi


Neurogenic bladder


-Monitor renal function closely. Lira catheter. 


-IVF as above


-Renal ultrasound with multiple renal calculi.  Patient requests that his 

ultrasound results be sent to his urologist Dr. Ramírez.  Will consult 

neurology for further evaluation in view of multiple renal calculi with 

pyelonephritis/Escherichia coli bacteremia.





ID: 


Septic shock


Escherichia coli bacteremia


UTI/ pyelonephritis in the setting of multiple renal calculi/neurogenic bladder


-Follow-up on blood urine cultures, sputum culture.


Urine cultures/blood culture from 5/20 growing Escherichia coli sensitive to 

Rocephin 


-Patient was initially on IV vancomycin and Levaquin and Zosyn.  Zosyn switched 

to meropenem on 5/22.  Patient evaluated by ID who discontinued all antibiotics 

and switched to IV Rocephin.


-Single dose of gentamicin given 5/21


-ID consult noted.  Antibiotics per ID.





HEME: 


-Monitor CBC, CMP, coags





ENDO: 


Type 2 diabetes


Hyperglycemia


-Sliding-scale insulin


-Electrolyte replacement per protocol





PROPH: 


-Bilateral lower extremity SCDs. Lovenox 40 mg sq daily. PPI 





LINES:


-Left IJ central line (5/21)





Condition remains critical with septic shock secondary to Escherichia coli UTI/

bacteremia





CC time 45 min








Thanh Mcelroy MD May 23, 2017 07:22 moist